# Patient Record
Sex: FEMALE | HISPANIC OR LATINO | Employment: FULL TIME | URBAN - METROPOLITAN AREA
[De-identification: names, ages, dates, MRNs, and addresses within clinical notes are randomized per-mention and may not be internally consistent; named-entity substitution may affect disease eponyms.]

---

## 2018-02-16 ENCOUNTER — HOSPITAL ENCOUNTER (OUTPATIENT)
Dept: LAB | Facility: HOSPITAL | Age: 38
Setting detail: SPECIMEN
Discharge: HOME | End: 2018-02-16
Attending: INTERNAL MEDICINE | Admitting: INTERNAL MEDICINE

## 2018-02-16 LAB
CONV MICROALBUM.,U,RANDOM: 16 MG/L
CREAT 24H UR-MCNC: 33 MG/DL
MICROALBUMIN/CREAT UR: 48.5 UG/MG

## 2018-10-08 ENCOUNTER — HOSPITAL ENCOUNTER (OUTPATIENT)
Dept: LAB | Facility: HOSPITAL | Age: 38
Setting detail: SPECIMEN
Discharge: HOME | End: 2018-10-08
Attending: INTERNAL MEDICINE | Admitting: INTERNAL MEDICINE

## 2018-10-08 LAB — HBA1C MFR BLD: 6.5 % (ref 0–5.6)

## 2019-03-06 ENCOUNTER — HOSPITAL ENCOUNTER (OUTPATIENT)
Dept: LAB | Facility: HOSPITAL | Age: 39
Setting detail: SPECIMEN
Discharge: HOME | End: 2019-03-06
Attending: INTERNAL MEDICINE | Admitting: INTERNAL MEDICINE

## 2019-06-25 RX ORDER — BLOOD SUGAR DIAGNOSTIC
STRIP MISCELLANEOUS
Qty: 200 EACH | Refills: 2 | Status: SHIPPED | OUTPATIENT
Start: 2019-06-25 | End: 2020-03-19 | Stop reason: SDUPTHER

## 2019-09-16 RX ORDER — INSULIN LISPRO 200 [IU]/ML
60 INJECTION, SOLUTION SUBCUTANEOUS DAILY
Qty: 30 ML | Refills: 0 | Status: CANCELLED | OUTPATIENT
Start: 2019-09-16

## 2019-09-16 RX ORDER — INSULIN LISPRO 200 [IU]/ML
INJECTION, SOLUTION SUBCUTANEOUS
Refills: 1 | OUTPATIENT
Start: 2019-09-16

## 2019-09-16 NOTE — ADDENDUM NOTE
Addended by: ANA ROSA LYON on: 9/16/2019 09:53 AM     Modules accepted: Orders, Level of Service, SmartSet

## 2019-09-17 ENCOUNTER — TELEPHONE (OUTPATIENT)
Dept: ENDOCRINOLOGY | Facility: CLINIC | Age: 39
End: 2019-09-17

## 2019-09-18 PROBLEM — E10.65 TYPE 1 DIABETES MELLITUS WITH HYPERGLYCEMIA: Status: ACTIVE | Noted: 2017-04-05

## 2019-09-18 PROBLEM — Z79.4 LONG TERM CURRENT USE OF INSULIN: Status: ACTIVE | Noted: 2017-04-05

## 2019-10-14 RX ORDER — INSULIN LISPRO 200 [IU]/ML
60 INJECTION, SOLUTION SUBCUTANEOUS DAILY
Qty: 30 ML | Refills: 1 | Status: SHIPPED | OUTPATIENT
Start: 2019-10-14 | End: 2020-03-19

## 2019-10-17 ENCOUNTER — TELEPHONE (OUTPATIENT)
Dept: ENDOCRINOLOGY | Facility: CLINIC | Age: 39
End: 2019-10-17

## 2019-10-17 NOTE — TELEPHONE ENCOUNTER
CALLED PATIENT TO GET THE 10/23  APPT RESCHEDULED. HAD TO LEAVE HER A DETAILED VM TO CALL THE [facility] TO GET RE-SCHEDULED

## 2020-01-13 ENCOUNTER — TELEPHONE (OUTPATIENT)
Dept: ENDOCRINOLOGY | Facility: CLINIC | Age: 40
End: 2020-01-13

## 2020-01-13 NOTE — TELEPHONE ENCOUNTER
Pt needs samples of insulin is it OK to give her samples of medications insulin to her. Last seen 03/06/2019, Takes Humalog and Levemir.

## 2020-01-24 NOTE — TELEPHONE ENCOUNTER
"Spoke with alejandra and informed her I was sending in a 30 day supply of her insulin. She stated we don't understand that she doesn't have insurance and she's usually good at keeping her appointments. I informed her I was just following doctor's orders. She stated we don't understand that she needs this and once she gets insurance she is finding a new doctor. I apologized and stated I understand, I'm just following doctor's orders. She stated \"she's fed up with shit\" and hung up on me.   "

## 2020-03-19 ENCOUNTER — OFFICE VISIT (OUTPATIENT)
Dept: ENDOCRINOLOGY | Facility: CLINIC | Age: 40
End: 2020-03-19

## 2020-03-19 VITALS
WEIGHT: 155 LBS | BODY MASS INDEX: 28.52 KG/M2 | DIASTOLIC BLOOD PRESSURE: 70 MMHG | SYSTOLIC BLOOD PRESSURE: 120 MMHG | OXYGEN SATURATION: 94 % | HEART RATE: 99 BPM | HEIGHT: 62 IN

## 2020-03-19 DIAGNOSIS — E78.2 MIXED HYPERLIPIDEMIA: ICD-10-CM

## 2020-03-19 DIAGNOSIS — E10.65 TYPE 1 DIABETES MELLITUS WITH HYPERGLYCEMIA (HCC): Primary | ICD-10-CM

## 2020-03-19 PROBLEM — O09.519 PRIMIGRAVIDA OF ADVANCED MATERNAL AGE: Status: ACTIVE | Noted: 2018-01-01

## 2020-03-19 PROBLEM — O24.019 TYPE 1 DIABETES MELLITUS DURING PREGNANCY: Status: ACTIVE | Noted: 2018-05-13

## 2020-03-19 PROBLEM — H54.7 CONGENITAL BLINDNESS: Status: ACTIVE | Noted: 2018-05-13

## 2020-03-19 PROBLEM — O24.919 DIABETES MELLITUS COMPLICATING PREGNANCY: Status: ACTIVE | Noted: 2018-12-03

## 2020-03-19 PROBLEM — O24.319 PREEXISTING DIABETES COMPLICATING PREGNANCY, ANTEPARTUM: Status: ACTIVE | Noted: 2018-01-01

## 2020-03-19 PROCEDURE — 99214 OFFICE O/P EST MOD 30 MIN: CPT | Performed by: INTERNAL MEDICINE

## 2020-03-19 RX ORDER — INSULIN GLARGINE 100 [IU]/ML
16 INJECTION, SOLUTION SUBCUTANEOUS DAILY
COMMUNITY
Start: 2018-12-13 | End: 2020-03-19

## 2020-03-19 RX ORDER — IBUPROFEN 800 MG/1
800 TABLET ORAL EVERY 8 HOURS
COMMUNITY
Start: 2018-12-13 | End: 2020-03-19

## 2020-03-19 RX ORDER — OXYCODONE HYDROCHLORIDE AND ACETAMINOPHEN 5; 325 MG/1; MG/1
1 TABLET ORAL
COMMUNITY
Start: 2018-12-13 | End: 2020-03-19

## 2020-03-19 RX ORDER — PRENATAL VIT,CAL 76/IRON/FOLIC 29 MG-1 MG
TABLET ORAL
COMMUNITY
Start: 2018-05-04 | End: 2020-03-19

## 2020-03-19 RX ORDER — INSULIN LISPRO 100 [IU]/ML
INJECTION, SOLUTION INTRAVENOUS; SUBCUTANEOUS
Qty: 15 PEN | Refills: 6 | Status: SHIPPED | OUTPATIENT
Start: 2020-03-19 | End: 2020-09-02 | Stop reason: SDUPTHER

## 2020-03-19 NOTE — PATIENT INSTRUCTIONS
Change Levemir to 30 units subcu twice a day  Continue Humalog 1 unit for each 5 g of carb per before meals and a sliding scale of 1 unit for each 50/150 over meals  Always keep glucose source with you in case of low blood sugar  Always get regular eye exam and flu vaccine  Please get labs done fasting in next few days  Follow-up in 4 to 6 weeks.

## 2020-03-19 NOTE — PROGRESS NOTES
Endocrine Progress Note Outpatient     Patient Care Team:  Elsi Covington MD as PCP - General    Chief Complaint: Follow-up type 1 diabetes    HPI: 39-year-old female with history of type 1 diabetes diagnosed in 2015.  She was following with Dr. Lan cleary, last seen in March 2019.  She did have some issues with insurance but now she has insurance and want to take care of herself.  She is currently using Levemir 34 units twice a day with Humalog 1 unit for each 5 g of carbohydrate.  She feels like she is having low blood sugars, her job requires her to be on her feet for about 10 hours a day 4 times a week and she is having some low blood sugars.  Unfortunately she did not bring any blood sugar records for review she is starting to check sugars now.  She has not been hospitalized or went to the emergency room or required assistance for low blood sugar since last seen in March 2019.  Has done diabetes education, last eye exam was 2 years ago and she is advised to get that done.  Diabetes complications: None.  She does have hyperlipidemia but she is not taking any cholesterol-lowering medications at this time.  She is not planning anymore pregnancies.    Past Medical History:   Diagnosis Date   • Diabetes mellitus type I (CMS/[facility])    • Hyperlipidemia        Social History     Socioeconomic History   • Marital status:      Spouse name: Not on file   • Number of children: Not on file   • Years of education: Not on file   • Highest education level: Not on file   Tobacco Use   • Smoking status: Never Smoker   Substance and Sexual Activity   • Alcohol use: Never     Frequency: Never       Family History   Problem Relation Age of Onset   • Bipolar disorder Mother    • Schizophrenia Mother    • Ulcers Father    • Asthma Sister        Allergies   Allergen Reactions   • Codeine Other (See Comments)     Memory loss          ROS:   Constitutional:  Admit fatigue, tiredness.    Eyes:  Denies change in visual  acuity   HENT:  Denies nasal congestion or sore throat   Respiratory: denies cough, shortness of breath.   Cardiovascular:  denies chest pain, edema   GI:  Denies abdominal pain, nausea, vomiting.   Musculoskeletal:  Denies back pain or joint pain   Integument:  Denies dry skin and rash   Neurologic:  Denies headache, focal weakness or sensory changes   Endocrine:  Denies polyuria or polydipsia   Psychiatric:  Denies depression or anxiety      Vitals:    03/19/20 1330   BP: 120/70   Pulse: 99   SpO2: 94%       Physical Exam:  GEN: NAD, conversant  EYES: EOMI, PERRL, no conjunctival erythema  NECK: no thyromegaly, full ROM   CV: RRR, no murmurs/rubs/gallops, no peripheral edema  LUNG: CTAB, no wheezes/rales/ronchi  SKIN: no rashes, no acanthosis  MSK: no deformities, full ROM of all extremities  NEURO: no tremors, DTR normal  PSYCH: AOX3, appropriate mood, affect normal      Results Review:     I reviewed the patient's new clinical results.    Lab Results   Component Value Date    HGBA1C 6.5 (H) 10/08/2018      Lab Results   Component Value Date    GLUCOSE 223 (H) 03/28/2017    BUN 10 12/10/2018    CREATININE 0.3 (L) 12/10/2018    BCR 33.3 12/10/2018    K 4.1 12/10/2018    CO2 21 (L) 12/10/2018    CALCIUM 8.3 (L) 12/10/2018    ALBUMIN 3.1 (L) 12/10/2018    LABIL2 0.8 (L) 12/10/2018    AST 22 12/10/2018    ALT 42 12/10/2018       Medication Review: Reviewed.       Current Outpatient Medications:   •  ACCU-CHEK LUIZA PLUS test strip, CHECK BLOOD SUGAR 7 TIMES DAILY BEFORE MEALS, ONE HOUR AFTER MEALS AND BEDTIME, Disp: 200 each, Rfl: 2  •  ACCU-CHEK FASTCLIX LANCETS misc, ACCU-CHEK FASTCLIX LANCETS, Disp: , Rfl:   •  Blood Glucose Monitoring Suppl (ACCU-CHEK LUIZA PLUS) w/Device kit, ACCU-CHEK LUIZA PLUS w/Device KIT, Disp: , Rfl:   •  glucose (DEX4) 4 GM chewable tablet, Chew 16 g., Disp: , Rfl:   •  HUMALOG KWIKPEN 200 UNIT/ML solution pen-injector, Inject 60 Units under the skin into the appropriate area as directed  Daily. INJECT PER SLIDING SCALE MAX 60 UNITS PER DAY DX:E11.65, Disp: 30 mL, Rfl: 1  •  insulin detemir (LEVEMIR FLEXTOUCH) 100 UNIT/ML injection, Inject 35 Units under the skin into the appropriate area as directed 2 (Two) Times a Day., Disp: 21 mL, Rfl: 0  •  Insulin Pen Needle (B-D UF III MINI PEN NEEDLES) 31G X 5 MM misc, BD PEN NEEDLE MINI U/F 31G X 5 MM, Disp: , Rfl:       Assessment/Plan   1.  Diabetes mellitus type 1: Uncontrolled with some low blood sugars, at this time I have asked her to decrease Levemir to 30 units twice a day and continue Humalog 1 unit for each 5 g of carbohydrate with meals.  She is advised to check blood sugars at least 3 ideally 4 times a day and bring the records for review at each visit.  Also advised to always keep glucose source with her and she does have Glucagon Emergency Kit.  Recommended to get an eye exam and flu vaccine on regular basis.  Also will get some labs.  2.  Hyperlipidemia: At this time she is not on any cholesterol-lowering medications, will check lipid panel and then make further recommendations.            Jody Saldana MD FACE.

## 2020-03-20 ENCOUNTER — LAB (OUTPATIENT)
Dept: LAB | Facility: HOSPITAL | Age: 40
End: 2020-03-20

## 2020-03-20 DIAGNOSIS — E78.2 MIXED HYPERLIPIDEMIA: ICD-10-CM

## 2020-03-20 DIAGNOSIS — E10.65 TYPE 1 DIABETES MELLITUS WITH HYPERGLYCEMIA (HCC): ICD-10-CM

## 2020-03-20 LAB
ALBUMIN SERPL-MCNC: 4.1 G/DL (ref 3.5–5.2)
ALBUMIN UR-MCNC: <1.2 MG/DL
ALBUMIN/GLOB SERPL: 1.4 G/DL
ALP SERPL-CCNC: 57 U/L (ref 39–117)
ALT SERPL W P-5'-P-CCNC: 20 U/L (ref 1–33)
ANION GAP SERPL CALCULATED.3IONS-SCNC: 10.8 MMOL/L (ref 5–15)
AST SERPL-CCNC: 16 U/L (ref 1–32)
BILIRUB SERPL-MCNC: 0.3 MG/DL (ref 0.2–1.2)
BUN BLD-MCNC: 13 MG/DL (ref 6–20)
BUN/CREAT SERPL: 21.3 (ref 7–25)
CALCIUM SPEC-SCNC: 8.8 MG/DL (ref 8.6–10.5)
CHLORIDE SERPL-SCNC: 101 MMOL/L (ref 98–107)
CHOLEST SERPL-MCNC: 145 MG/DL (ref 0–200)
CO2 SERPL-SCNC: 23.2 MMOL/L (ref 22–29)
CREAT BLD-MCNC: 0.61 MG/DL (ref 0.57–1)
CREAT UR-MCNC: 91.5 MG/DL
GFR SERPL CREATININE-BSD FRML MDRD: 109 ML/MIN/1.73
GFR SERPL CREATININE-BSD FRML MDRD: 132 ML/MIN/1.73
GLOBULIN UR ELPH-MCNC: 2.9 GM/DL
GLUCOSE BLD-MCNC: 321 MG/DL (ref 65–99)
HBA1C MFR BLD: 9 % (ref 3.5–5.6)
HDLC SERPL-MCNC: 42 MG/DL (ref 40–60)
LDLC SERPL CALC-MCNC: 89 MG/DL (ref 0–100)
LDLC/HDLC SERPL: 2.11 {RATIO}
MICROALBUMIN/CREAT UR: NORMAL MG/G{CREAT}
POTASSIUM BLD-SCNC: 4.3 MMOL/L (ref 3.5–5.2)
PROT SERPL-MCNC: 7 G/DL (ref 6–8.5)
SODIUM BLD-SCNC: 135 MMOL/L (ref 136–145)
TRIGL SERPL-MCNC: 71 MG/DL (ref 0–150)
VLDLC SERPL-MCNC: 14.2 MG/DL (ref 5–40)

## 2020-03-20 PROCEDURE — 36415 COLL VENOUS BLD VENIPUNCTURE: CPT

## 2020-03-20 PROCEDURE — 82570 ASSAY OF URINE CREATININE: CPT

## 2020-03-20 PROCEDURE — 83036 HEMOGLOBIN GLYCOSYLATED A1C: CPT

## 2020-03-20 PROCEDURE — 82043 UR ALBUMIN QUANTITATIVE: CPT

## 2020-03-20 PROCEDURE — 80061 LIPID PANEL: CPT

## 2020-03-20 PROCEDURE — 80053 COMPREHEN METABOLIC PANEL: CPT

## 2020-04-27 ENCOUNTER — TELEPHONE (OUTPATIENT)
Dept: ENDOCRINOLOGY | Facility: CLINIC | Age: 40
End: 2020-04-27

## 2020-04-29 RX ORDER — PREDNISOLONE ACETATE 10 MG/ML
SUSPENSION/ DROPS OPHTHALMIC EVERY 6 HOURS
COMMUNITY
Start: 2015-10-01 | End: 2020-04-30

## 2020-04-30 ENCOUNTER — TELEMEDICINE (OUTPATIENT)
Dept: ENDOCRINOLOGY | Facility: CLINIC | Age: 40
End: 2020-04-30

## 2020-04-30 VITALS — BODY MASS INDEX: 28.35 KG/M2 | HEIGHT: 62 IN

## 2020-04-30 DIAGNOSIS — E10.65 TYPE 1 DIABETES MELLITUS WITH HYPERGLYCEMIA (HCC): Primary | ICD-10-CM

## 2020-04-30 DIAGNOSIS — E78.2 MIXED HYPERLIPIDEMIA: ICD-10-CM

## 2020-04-30 PROCEDURE — 99214 OFFICE O/P EST MOD 30 MIN: CPT | Performed by: INTERNAL MEDICINE

## 2020-04-30 NOTE — PROGRESS NOTES
Endocrine Progress Note Outpatient     Patient Care Team:  Elsi Covington MD as PCP - General  You have chosen to receive care through a telehealth visit.  Do you consent to use a video/audio connection for your medical care today? Yes.   Chief Complaint: Uncontrolled type 1 diabetes: Visit conducted through audio and video conferencing through [employer].     HPI: 39-year-old female with history of type 1 diabetes since 2015, also have hyperlipidemia is followed through telehealth.  For type 1 diabetes: She is currently on Levemir 30 units at night with Humalog 1 unit for each 5 g of carbohydrate.  Diabetes complication: None  Hyperlipidemia: She is not taking any cholesterol-lowering medications at this time.    Past Medical History:   Diagnosis Date   • Diabetes mellitus type I (CMS/[facility])    • Hyperlipidemia        Social History     Socioeconomic History   • Marital status:      Spouse name: Not on file   • Number of children: Not on file   • Years of education: Not on file   • Highest education level: Not on file   Tobacco Use   • Smoking status: Never Smoker   • Smokeless tobacco: Never Used   Substance and Sexual Activity   • Alcohol use: Never     Frequency: Never       Family History   Problem Relation Age of Onset   • Bipolar disorder Mother    • Schizophrenia Mother    • Ulcers Father    • Asthma Sister        Allergies   Allergen Reactions   • Codeine Other (See Comments)     Memory loss          ROS:   Constitutional:  Denies fatigue, tiredness.    Eyes:  Denies change in visual acuity   HENT:  Denies nasal congestion or sore throat   Respiratory: denies cough, shortness of breath.   Cardiovascular:  denies chest pain, edema   GI:  Denies abdominal pain, nausea, vomiting.   Musculoskeletal:  Denies back pain or joint pain   Integument:  Denies dry skin and rash   Neurologic:  Denies headache, focal weakness or sensory changes   Endocrine:  Denies polyuria or polydipsia   Psychiatric:   Denies depression or anxiety      There were no vitals filed for this visit.    Physical Exam:  GEN: NAD, patient looked healthy on video.    Alert and oriented and able to carry on conversation.  Respiratory effort was normal.  Mood and affect were normal.      Results Review:     I reviewed the patient's new clinical results.    Lab Results   Component Value Date    HGBA1C 9.0 (H) 03/20/2020    HGBA1C 6.5 (H) 10/08/2018      Lab Results   Component Value Date    GLUCOSE 321 (H) 03/20/2020    BUN 13 03/20/2020    CREATININE 0.61 03/20/2020    EGFRIFNONA 109 03/20/2020    EGFRIFAFRI 132 03/20/2020    BCR 21.3 03/20/2020    K 4.3 03/20/2020    CO2 23.2 03/20/2020    CALCIUM 8.8 03/20/2020    ALBUMIN 4.10 03/20/2020    LABIL2 0.8 (L) 12/10/2018    AST 16 03/20/2020    ALT 20 03/20/2020    CHOL 145 03/20/2020    TRIG 71 03/20/2020    LDL 89 03/20/2020    HDL 42 03/20/2020     No results found for: TSH, FREET4, THYROIDAB      Medication Review: Reviewed.       Current Outpatient Medications:   •  ACCU-CHEK FASTCLIX LANCETS misc, ACCU-CHEK FASTCLIX LANCETS, Disp: , Rfl:   •  Blood Glucose Monitoring Suppl (ACCU-CHEK LUIZA PLUS) w/Device kit, ACCU-CHEK LUIZA PLUS w/Device KIT, Disp: , Rfl:   •  glucose (DEX4) 4 GM chewable tablet, Chew 4 tablets As Needed for Low Blood Sugar., Disp: 100 tablet, Rfl: 6  •  glucose blood (Accu-Chek Luiza Plus) test strip, Check blood sugars 4 times a day, Disp: 200 each, Rfl: 6  •  insulin detemir (Levemir FlexTouch) 100 UNIT/ML injection, Inject 30 units subcu twice a day., Disp: 15 pen, Rfl: 6  •  Insulin Lispro, 1 Unit Dial, (HumaLOG KwikPen) 100 UNIT/ML solution pen-injector, 1 unit for each 5 g of carbohydrate before each meal., Disp: 15 pen, Rfl: 6  •  Insulin Pen Needle (B-D UF III MINI PEN NEEDLES) 31G X 5 MM misc, Used to inject insulin 4 times a day, Disp: 100 each, Rfl: 6      Assessment/Plan   1.  Diabetes mellitus type 1: Uncontrolled with high A1c of 9%.  Have advised her to  send me blood sugar records for review.  She tells me that on the days that she works she is physically active and has some low blood sugars, I have asked her to take Levemir 26 units at night before on the days that she is going to work and continue 30 night before the days that she is now out of work.  We did talk about insulin pump however given the financial situation she is not interested in that at this time.  She is advised to always keep glucose source in case of low blood sugar and get regular eye exam and flu vaccine.  2.  Hyperlipidemia: LDL is 89 and triglycerides 71 and she is not on any medications at this time.            Jody Saldana MD FACE.

## 2020-04-30 NOTE — PATIENT INSTRUCTIONS
On the days that you work night before that take Levemir 26 units  On the days that you do not work you can take Levemir 26 units the night before  Always keep glucose source with you in case of low blood sugar  Get regular eye exam  Follow-up in 3 months with labs.

## 2020-08-27 ENCOUNTER — TELEMEDICINE (OUTPATIENT)
Dept: ENDOCRINOLOGY | Facility: CLINIC | Age: 40
End: 2020-08-27

## 2020-08-27 ENCOUNTER — LAB (OUTPATIENT)
Dept: LAB | Facility: HOSPITAL | Age: 40
End: 2020-08-27

## 2020-08-27 VITALS — WEIGHT: 153 LBS | HEIGHT: 62 IN | BODY MASS INDEX: 28.16 KG/M2

## 2020-08-27 DIAGNOSIS — E10.65 TYPE 1 DIABETES MELLITUS WITH HYPERGLYCEMIA (HCC): ICD-10-CM

## 2020-08-27 DIAGNOSIS — E78.2 MIXED HYPERLIPIDEMIA: ICD-10-CM

## 2020-08-27 DIAGNOSIS — E10.65 TYPE 1 DIABETES MELLITUS WITH HYPERGLYCEMIA (HCC): Primary | ICD-10-CM

## 2020-08-27 LAB
ALBUMIN SERPL-MCNC: 4 G/DL (ref 3.5–5.2)
ALBUMIN UR-MCNC: <1.2 MG/DL
ALBUMIN/GLOB SERPL: 1.2 G/DL
ALP SERPL-CCNC: 55 U/L (ref 39–117)
ALT SERPL W P-5'-P-CCNC: 20 U/L (ref 1–33)
ANION GAP SERPL CALCULATED.3IONS-SCNC: 8.1 MMOL/L (ref 5–15)
AST SERPL-CCNC: 9 U/L (ref 1–32)
BILIRUB SERPL-MCNC: 0.4 MG/DL (ref 0–1.2)
BUN SERPL-MCNC: 9 MG/DL (ref 6–20)
BUN/CREAT SERPL: 16.7 (ref 7–25)
CALCIUM SPEC-SCNC: 8.8 MG/DL (ref 8.6–10.5)
CHLORIDE SERPL-SCNC: 100 MMOL/L (ref 98–107)
CHOLEST SERPL-MCNC: 147 MG/DL (ref 0–200)
CO2 SERPL-SCNC: 22.9 MMOL/L (ref 22–29)
CREAT SERPL-MCNC: 0.54 MG/DL (ref 0.57–1)
CREAT UR-MCNC: 54.5 MG/DL
GFR SERPL CREATININE-BSD FRML MDRD: 126 ML/MIN/1.73
GFR SERPL CREATININE-BSD FRML MDRD: >150 ML/MIN/1.73
GLOBULIN UR ELPH-MCNC: 3.4 GM/DL
GLUCOSE SERPL-MCNC: 339 MG/DL (ref 65–99)
HBA1C MFR BLD: 8.6 % (ref 3.5–5.6)
HDLC SERPL-MCNC: 42 MG/DL (ref 40–60)
LDLC SERPL CALC-MCNC: 88 MG/DL (ref 0–100)
LDLC/HDLC SERPL: 2.1 {RATIO}
MICROALBUMIN/CREAT UR: NORMAL MG/G{CREAT}
POTASSIUM SERPL-SCNC: 4.5 MMOL/L (ref 3.5–5.2)
PROT SERPL-MCNC: 7.4 G/DL (ref 6–8.5)
SODIUM SERPL-SCNC: 131 MMOL/L (ref 136–145)
TRIGL SERPL-MCNC: 84 MG/DL (ref 0–150)
VLDLC SERPL-MCNC: 16.8 MG/DL (ref 5–40)

## 2020-08-27 PROCEDURE — 80053 COMPREHEN METABOLIC PANEL: CPT

## 2020-08-27 PROCEDURE — 36415 COLL VENOUS BLD VENIPUNCTURE: CPT

## 2020-08-27 PROCEDURE — 83036 HEMOGLOBIN GLYCOSYLATED A1C: CPT

## 2020-08-27 PROCEDURE — 82043 UR ALBUMIN QUANTITATIVE: CPT

## 2020-08-27 PROCEDURE — 80061 LIPID PANEL: CPT

## 2020-08-27 PROCEDURE — 99214 OFFICE O/P EST MOD 30 MIN: CPT | Performed by: INTERNAL MEDICINE

## 2020-08-27 PROCEDURE — 82570 ASSAY OF URINE CREATININE: CPT

## 2020-08-27 RX ORDER — DEXTROSE 4 G
TABLET,CHEWABLE ORAL
COMMUNITY
Start: 2020-05-23 | End: 2021-04-12

## 2020-08-27 NOTE — PATIENT INSTRUCTIONS
Continue current medications  Always keep glucose source in case of low blood sugar  Get annual eye exam  Follow-up in 6 months with labs.

## 2020-08-27 NOTE — PROGRESS NOTES
Endocrine Progress Note Outpatient     Patient Care Team:  Elsi Covington MD as PCP - General  You have chosen to receive care through a telehealth visit.  Do you consent to use a video/audio connection for your medical care today? Yes.   Chief Complaint: Uncontrolled type 1 diabetes: Visit conducted through audio and video conferencing through [employer].     HPI: 39-year-old female with history of type 1 diabetes since 2015, also have hyperlipidemia is followed through telehealth.    For type 1 diabetes: She is currently on Levemir 26 units units at night on the days she works and 30 units on the days she does not work along with Humalog 1 unit for each 5 g of carbohydrate.  She tells me that she has been trying to work on her diet her blood sugars have improved she denies any low blood sugars.  Diabetes complication: None  Hyperlipidemia: She is not taking any cholesterol-lowering medications at this time.    Past Medical History:   Diagnosis Date   • Diabetes mellitus type I (CMS/[facility])    • Hyperlipidemia        Social History     Socioeconomic History   • Marital status:      Spouse name: Not on file   • Number of children: Not on file   • Years of education: Not on file   • Highest education level: Not on file   Tobacco Use   • Smoking status: Never Smoker   • Smokeless tobacco: Never Used   Substance and Sexual Activity   • Alcohol use: Never     Frequency: Never   • Drug use: Defer   • Sexual activity: Defer       Family History   Problem Relation Age of Onset   • Bipolar disorder Mother    • Schizophrenia Mother    • Ulcers Father    • Asthma Sister        Allergies   Allergen Reactions   • Codeine Other (See Comments)     Memory loss          ROS:   Constitutional:  Denies fatigue, tiredness.    Eyes:  Denies change in visual acuity   HENT:  Denies nasal congestion or sore throat   Respiratory: denies cough, shortness of breath.   Cardiovascular:  denies chest pain, edema   GI:  Denies  abdominal pain, nausea, vomiting.   Musculoskeletal:  Denies back pain or joint pain   Integument:  Denies dry skin and rash   Neurologic:  Denies headache, focal weakness or sensory changes   Endocrine:  Denies polyuria or polydipsia   Psychiatric:  Denies depression or anxiety      There were no vitals filed for this visit.    Physical Exam:  GEN: NAD, patient looked healthy on video.    Alert and oriented and able to carry on conversation.  Respiratory effort was normal.  Mood and affect were normal.      Results Review:     I reviewed the patient's new clinical results.    Lab Results   Component Value Date    HGBA1C 8.6 (H) 08/27/2020    HGBA1C 9.0 (H) 03/20/2020    HGBA1C 6.5 (H) 10/08/2018      Lab Results   Component Value Date    GLUCOSE 321 (H) 03/20/2020    BUN 13 03/20/2020    CREATININE 0.61 03/20/2020    EGFRIFNONA 109 03/20/2020    EGFRIFAFRI 132 03/20/2020    BCR 21.3 03/20/2020    K 4.3 03/20/2020    CO2 23.2 03/20/2020    CALCIUM 8.8 03/20/2020    ALBUMIN 4.10 03/20/2020    LABIL2 0.8 (L) 12/10/2018    AST 16 03/20/2020    ALT 20 03/20/2020    CHOL 145 03/20/2020    TRIG 71 03/20/2020    LDL 89 03/20/2020    HDL 42 03/20/2020         Medication Review: Reviewed.       Current Outpatient Medications:   •  ACCU-CHEK FASTCLIX LANCETS misc, ACCU-CHEK FASTCLIX LANCETS, Disp: , Rfl:   •  Blood Glucose Monitoring Suppl (ACCU-CHEK LUIZA PLUS) w/Device kit, ACCU-CHEK LUIZA PLUS w/Device KIT, Disp: , Rfl:   •  [employer]S GLUCOSE 4-6 GM-MG chewable tablet chewable tablet, CHEW 4 TABLETS AS NEEDED FOR LOW BLOOD SUGAR., Disp: , Rfl:   •  glucose (DEX4) 4 GM chewable tablet, Chew 4 tablets As Needed for Low Blood Sugar., Disp: 100 tablet, Rfl: 6  •  glucose blood (Accu-Chek Luiza Plus) test strip, Check blood sugars 4 times a day, Disp: 200 each, Rfl: 6  •  insulin detemir (Levemir FlexTouch) 100 UNIT/ML injection, Inject 30 units subcu twice a day., Disp: 15 pen, Rfl: 6  •  Insulin Lispro, 1 Unit Dial, (HumaLOG  KwikPen) 100 UNIT/ML solution pen-injector, 1 unit for each 5 g of carbohydrate before each meal., Disp: 15 pen, Rfl: 6  •  Insulin Pen Needle (B-D UF III MINI PEN NEEDLES) 31G X 5 MM misc, Used to inject insulin 4 times a day, Disp: 100 each, Rfl: 6      Assessment/Plan   1.  Diabetes mellitus type 1: Uncontrolled with high A1c of 8.6%.  She is trying to work on her diet.  For now I have asked her to continue Levemir 26 units at night before on the days that she is going to work and continue 30 night before the days that she is now out of work.  We did talk about insulin pump however given the financial situation she is not interested in that at this time.  She is advised to always keep glucose source in case of low blood sugar and get regular eye exam and flu vaccine.    2.  Hyperlipidemia: Lipid panel pending.  Will follow.            Jody Saldana MD FACE.

## 2020-09-02 RX ORDER — INSULIN LISPRO 100 [IU]/ML
INJECTION, SOLUTION INTRAVENOUS; SUBCUTANEOUS
Qty: 15 ML | Refills: 5 | Status: SHIPPED | OUTPATIENT
Start: 2020-09-02 | End: 2021-02-19 | Stop reason: SDUPTHER

## 2020-09-02 RX ORDER — INSULIN LISPRO 100 [IU]/ML
INJECTION, SOLUTION INTRAVENOUS; SUBCUTANEOUS
Qty: 15 PEN | Refills: 5 | Status: SHIPPED | OUTPATIENT
Start: 2020-09-02 | End: 2020-09-02

## 2020-12-23 ENCOUNTER — TELEPHONE (OUTPATIENT)
Dept: ENDOCRINOLOGY | Facility: CLINIC | Age: 40
End: 2020-12-23

## 2020-12-23 DIAGNOSIS — E10.65 TYPE 1 DIABETES MELLITUS WITH HYPERGLYCEMIA (HCC): Primary | ICD-10-CM

## 2020-12-23 NOTE — TELEPHONE ENCOUNTER
Pt called and stated she tested positive for covid. Reviewed sick day guidelines and told pt to call [facility] hotline if experiencing consistently high BGs or has ketones. Pt also requested a refill on pen needles. Rx's for ketostix and needles ready for signature.

## 2020-12-24 RX ORDER — FLURBIPROFEN SODIUM 0.3 MG/ML
SOLUTION/ DROPS OPHTHALMIC
Qty: 100 EACH | Refills: 6 | Status: SHIPPED | OUTPATIENT
Start: 2020-12-24 | End: 2022-01-12 | Stop reason: SDUPTHER

## 2021-02-10 ENCOUNTER — TELEPHONE (OUTPATIENT)
Dept: ENDOCRINOLOGY | Facility: CLINIC | Age: 41
End: 2021-02-10

## 2021-02-10 DIAGNOSIS — E10.65 TYPE 1 DIABETES MELLITUS WITH HYPERGLYCEMIA (HCC): Primary | ICD-10-CM

## 2021-02-10 DIAGNOSIS — E78.2 MIXED HYPERLIPIDEMIA: ICD-10-CM

## 2021-02-19 ENCOUNTER — TELEMEDICINE (OUTPATIENT)
Dept: ENDOCRINOLOGY | Facility: CLINIC | Age: 41
End: 2021-02-19

## 2021-02-19 VITALS — WEIGHT: 163 LBS | HEIGHT: 62 IN | BODY MASS INDEX: 30 KG/M2

## 2021-02-19 DIAGNOSIS — E78.2 MIXED HYPERLIPIDEMIA: ICD-10-CM

## 2021-02-19 DIAGNOSIS — E10.65 TYPE 1 DIABETES MELLITUS WITH HYPERGLYCEMIA (HCC): Primary | ICD-10-CM

## 2021-02-19 PROCEDURE — 99214 OFFICE O/P EST MOD 30 MIN: CPT | Performed by: INTERNAL MEDICINE

## 2021-02-19 RX ORDER — INSULIN LISPRO 100 [IU]/ML
INJECTION, SOLUTION INTRAVENOUS; SUBCUTANEOUS
Qty: 15 ML | Refills: 5 | Status: SHIPPED | OUTPATIENT
Start: 2021-02-19 | End: 2021-05-27

## 2021-02-19 NOTE — PATIENT INSTRUCTIONS
Please continue current management  Always keep glucose source in case of low blood sugar  Annual eye exam and flu vaccine  Labs in the next few days

## 2021-02-19 NOTE — PROGRESS NOTES
Endocrine Progress Note Outpatient     Patient Care Team:  Elsi Covington MD as PCP - General  You have chosen to receive care through a telehealth visit.  Do you consent to use a video/audio connection for your medical care today? Yes.   Chief Complaint: Uncontrolled type 1 diabetes: Visit conducted through audio and video conferencing through [employer].     HPI: 40-year-old female with history of type 1 diabetes since 2015, also have hyperlipidemia is followed through telehealth.    For type 1 diabetes: She is currently on Levemir 30 units units at night on the days she works and 30 units on the days she does not work along with Humalog 1 unit for each 5 g of carbohydrate.  She tells me that she has been trying to work on her diet her blood sugars have improved she denies any low blood sugars.    Diabetes complication: None    Hyperlipidemia: She is not taking any cholesterol-lowering medications at this time.    Past Medical History:   Diagnosis Date   • Diabetes mellitus type I (CMS/[facility])    • Hyperlipidemia        Social History     Socioeconomic History   • Marital status:      Spouse name: Not on file   • Number of children: Not on file   • Years of education: Not on file   • Highest education level: Not on file   Tobacco Use   • Smoking status: Never Smoker   • Smokeless tobacco: Never Used   Substance and Sexual Activity   • Alcohol use: Never     Frequency: Never   • Drug use: Defer   • Sexual activity: Defer       Family History   Problem Relation Age of Onset   • Bipolar disorder Mother    • Schizophrenia Mother    • Ulcers Father    • Asthma Sister        Allergies   Allergen Reactions   • Codeine Other (See Comments)     Memory loss          ROS:   Constitutional:  Denies fatigue, tiredness.    Eyes:  Denies change in visual acuity   HENT:  Denies nasal congestion or sore throat   Respiratory: denies cough, shortness of breath.   Cardiovascular:  denies chest pain, edema   GI:  Denies  abdominal pain, nausea, vomiting.   Musculoskeletal:  Denies back pain or joint pain   Integument:  Denies dry skin and rash   Neurologic:  Denies headache, focal weakness or sensory changes   Endocrine:  Denies polyuria or polydipsia   Psychiatric:  Denies depression or anxiety      There were no vitals filed for this visit.    Physical Exam:  GEN: NAD, patient looked healthy on video.    Alert and oriented and able to carry on conversation.  Respiratory effort was normal.  Mood and affect were normal.      Results Review:     I reviewed the patient's new clinical results.    Lab Results   Component Value Date    HGBA1C 8.6 (H) 08/27/2020    HGBA1C 9.0 (H) 03/20/2020    HGBA1C 6.5 (H) 10/08/2018      Lab Results   Component Value Date    GLUCOSE 339 (H) 08/27/2020    BUN 9 08/27/2020    CREATININE 0.54 (L) 08/27/2020    EGFRIFNONA 126 08/27/2020    EGFRIFAFRI >150 08/27/2020    BCR 16.7 08/27/2020    K 4.5 08/27/2020    CO2 22.9 08/27/2020    CALCIUM 8.8 08/27/2020    ALBUMIN 4.00 08/27/2020    LABIL2 0.8 (L) 12/10/2018    AST 9 08/27/2020    ALT 20 08/27/2020    CHOL 147 08/27/2020    TRIG 84 08/27/2020    LDL 88 08/27/2020    HDL 42 08/27/2020         Medication Review: Reviewed.       Current Outpatient Medications:   •  ACCU-CHEK FASTCLIX LANCETS misc, ACCU-CHEK FASTCLIX LANCETS, Disp: , Rfl:   •  acetone, urine, test strip, Use to test for ketones as needed, Disp: 50 strip, Rfl: 1  •  Blood Glucose Monitoring Suppl (ACCU-CHEK LUIZA PLUS) w/Device kit, ACCU-CHEK LUIZA PLUS w/Device KIT, Disp: , Rfl:   •  CVS GLUCOSE 4-6 GM-MG chewable tablet chewable tablet, CHEW 4 TABLETS AS NEEDED FOR LOW BLOOD SUGAR., Disp: , Rfl:   •  glucose (DEX4) 4 GM chewable tablet, Chew 4 tablets As Needed for Low Blood Sugar., Disp: 100 tablet, Rfl: 6  •  glucose blood (Accu-Chek Luiza Plus) test strip, Check blood sugars 4 times a day, Disp: 200 each, Rfl: 6  •  insulin detemir (Levemir FlexTouch) 100 UNIT/ML injection, Inject 30  units subcu twice a day., Disp: 15 pen, Rfl: 6  •  Insulin Lispro, 1 Unit Dial, (HumaLOG KwikPen) 100 UNIT/ML solution pen-injector, 1 unit for each 5 g of carbohydrate before each meal. MDD 50 units, Disp: 15 mL, Rfl: 5  •  Insulin Pen Needle (B-D UF III MINI PEN NEEDLES) 31G X 5 MM misc, Used to inject insulin 4 times a day, Disp: 100 each, Rfl: 6      Assessment/Plan   1.  Diabetes mellitus type 1: No blood sugar records and no A1c.  At this time we will continue her current management.  I think she will benefit from insulin pump and continuous monitoring system.  She is not interested due to the financial situation. She is advised to always keep glucose source in case of low blood sugar and get regular eye exam and flu vaccine.    2.  Hyperlipidemia: No recent labs, will follow lipid panel.            Jody Saldana MD FACE.

## 2021-04-09 DIAGNOSIS — E10.65 TYPE 1 DIABETES MELLITUS WITH HYPERGLYCEMIA (HCC): Primary | ICD-10-CM

## 2021-04-12 RX ORDER — INSULIN DETEMIR 100 [IU]/ML
INJECTION, SOLUTION SUBCUTANEOUS
Qty: 18 PEN | Refills: 44 | Status: SHIPPED | OUTPATIENT
Start: 2021-04-12 | End: 2022-07-06

## 2021-04-12 RX ORDER — BLOOD SUGAR DIAGNOSTIC
STRIP MISCELLANEOUS
Qty: 200 EACH | Refills: 6 | Status: SHIPPED | OUTPATIENT
Start: 2021-04-12

## 2021-04-12 RX ORDER — DEXTROSE 4 G
TABLET,CHEWABLE ORAL
Qty: 100 TABLET | Refills: 6 | Status: SHIPPED | OUTPATIENT
Start: 2021-04-12

## 2021-05-24 DIAGNOSIS — E10.65 TYPE 1 DIABETES MELLITUS WITH HYPERGLYCEMIA (HCC): ICD-10-CM

## 2021-05-24 RX ORDER — FLURBIPROFEN SODIUM 0.3 MG/ML
SOLUTION/ DROPS OPHTHALMIC
Refills: 5 | OUTPATIENT
Start: 2021-05-24

## 2021-05-27 RX ORDER — INSULIN LISPRO 100 [IU]/ML
INJECTION, SOLUTION INTRAVENOUS; SUBCUTANEOUS
Qty: 45 ML | Refills: 3 | Status: SHIPPED | OUTPATIENT
Start: 2021-05-27 | End: 2022-03-14

## 2021-07-06 DIAGNOSIS — E10.65 TYPE 1 DIABETES MELLITUS WITH HYPERGLYCEMIA (HCC): Primary | ICD-10-CM

## 2021-07-06 RX ORDER — BLOOD-GLUCOSE METER
EACH MISCELLANEOUS
Qty: 1 KIT | Refills: 1 | Status: SHIPPED | OUTPATIENT
Start: 2021-07-06

## 2022-01-12 DIAGNOSIS — E10.65 TYPE 1 DIABETES MELLITUS WITH HYPERGLYCEMIA: ICD-10-CM

## 2022-01-12 RX ORDER — FLURBIPROFEN SODIUM 0.3 MG/ML
SOLUTION/ DROPS OPHTHALMIC
Qty: 100 EACH | Refills: 6 | Status: SHIPPED | OUTPATIENT
Start: 2022-01-12 | End: 2022-10-31

## 2022-03-14 RX ORDER — INSULIN LISPRO 100 [IU]/ML
INJECTION, SOLUTION INTRAVENOUS; SUBCUTANEOUS
Qty: 15 ML | Refills: 5 | Status: SHIPPED | OUTPATIENT
Start: 2022-03-14 | End: 2022-07-29

## 2022-06-01 ENCOUNTER — TELEPHONE (OUTPATIENT)
Dept: ENDOCRINOLOGY | Facility: CLINIC | Age: 42
End: 2022-06-01

## 2022-06-01 NOTE — TELEPHONE ENCOUNTER
[facility] papers filled out as much as I could and placed in Dr Saldana's inbox for completion and signature.

## 2022-06-14 ENCOUNTER — TELEPHONE (OUTPATIENT)
Dept: ENDOCRINOLOGY | Facility: CLINIC | Age: 42
End: 2022-06-14

## 2022-06-14 NOTE — TELEPHONE ENCOUNTER
Attempted to contact pt to reschedule her 7/5 appt. Lm on pts vm. Will offer appt next week when pt calls back.

## 2022-06-28 ENCOUNTER — TELEPHONE (OUTPATIENT)
Dept: ENDOCRINOLOGY | Facility: CLINIC | Age: 42
End: 2022-06-28

## 2022-06-28 NOTE — TELEPHONE ENCOUNTER
Tried to call patient regarding lab being closed. No answer left vm stating we are cx'ing lab appt for our office and ask that she goes to [facility] or contact our office and let know where to send orders.

## 2022-07-06 DIAGNOSIS — E10.65 TYPE 1 DIABETES MELLITUS WITH HYPERGLYCEMIA: ICD-10-CM

## 2022-07-06 RX ORDER — INSULIN DETEMIR 100 [IU]/ML
INJECTION, SOLUTION SUBCUTANEOUS
Qty: 15 ML | Refills: 4 | Status: SHIPPED | OUTPATIENT
Start: 2022-07-06 | End: 2023-01-18

## 2022-07-29 RX ORDER — INSULIN LISPRO 100 [IU]/ML
INJECTION, SOLUTION INTRAVENOUS; SUBCUTANEOUS
Qty: 15 ML | Refills: 5 | Status: SHIPPED | OUTPATIENT
Start: 2022-07-29 | End: 2022-11-28

## 2022-10-30 DIAGNOSIS — E10.65 TYPE 1 DIABETES MELLITUS WITH HYPERGLYCEMIA: ICD-10-CM

## 2022-10-31 RX ORDER — FLURBIPROFEN SODIUM 0.3 MG/ML
SOLUTION/ DROPS OPHTHALMIC
Qty: 100 EACH | Refills: 6 | Status: SHIPPED | OUTPATIENT
Start: 2022-10-31

## 2022-11-28 RX ORDER — INSULIN LISPRO 100 [IU]/ML
INJECTION, SOLUTION INTRAVENOUS; SUBCUTANEOUS
Qty: 15 ML | Refills: 3 | Status: SHIPPED | OUTPATIENT
Start: 2022-11-28 | End: 2022-11-28 | Stop reason: SDUPTHER

## 2022-11-28 RX ORDER — INSULIN LISPRO 100 [IU]/ML
INJECTION, SOLUTION INTRAVENOUS; SUBCUTANEOUS
Qty: 15 ML | Refills: 3 | Status: SHIPPED | OUTPATIENT
Start: 2022-11-28 | End: 2023-01-23

## 2023-01-17 DIAGNOSIS — E10.65 TYPE 1 DIABETES MELLITUS WITH HYPERGLYCEMIA: ICD-10-CM

## 2023-01-18 RX ORDER — INSULIN DETEMIR 100 [IU]/ML
30 INJECTION, SOLUTION SUBCUTANEOUS 2 TIMES DAILY
Qty: 15 ML | Refills: 0 | Status: SHIPPED | OUTPATIENT
Start: 2023-01-18 | End: 2023-02-09 | Stop reason: SDUPTHER

## 2023-01-23 ENCOUNTER — TELEPHONE (OUTPATIENT)
Dept: ENDOCRINOLOGY | Facility: CLINIC | Age: 43
End: 2023-01-23
Payer: COMMERCIAL

## 2023-01-23 DIAGNOSIS — E10.65 TYPE 1 DIABETES MELLITUS WITH HYPERGLYCEMIA: Primary | ICD-10-CM

## 2023-01-23 RX ORDER — INSULIN ASPART 100 [IU]/ML
INJECTION, SOLUTION INTRAVENOUS; SUBCUTANEOUS
Qty: 15 ML | Refills: 3 | Status: SHIPPED | OUTPATIENT
Start: 2023-01-23 | End: 2023-02-09 | Stop reason: SDUPTHER

## 2023-01-23 NOTE — TELEPHONE ENCOUNTER
Jody Saldana MD  to Me        1:31 PM  Okay to change to NovoLog.     LVM for pt to give the office a call back.

## 2023-02-02 ENCOUNTER — LAB (OUTPATIENT)
Dept: LAB | Facility: HOSPITAL | Age: 43
End: 2023-02-02
Payer: COMMERCIAL

## 2023-02-02 DIAGNOSIS — E10.65 TYPE 1 DIABETES MELLITUS WITH HYPERGLYCEMIA: ICD-10-CM

## 2023-02-02 DIAGNOSIS — E10.65 TYPE 1 DIABETES MELLITUS WITH HYPERGLYCEMIA: Primary | ICD-10-CM

## 2023-02-02 DIAGNOSIS — E78.2 MIXED HYPERLIPIDEMIA: ICD-10-CM

## 2023-02-02 LAB
ALBUMIN SERPL-MCNC: 4 G/DL (ref 3.5–5.2)
ALBUMIN UR-MCNC: <1.2 MG/DL
ALBUMIN/GLOB SERPL: 1.3 G/DL
ALP SERPL-CCNC: 64 U/L (ref 39–117)
ALT SERPL W P-5'-P-CCNC: 22 U/L (ref 1–33)
ANION GAP SERPL CALCULATED.3IONS-SCNC: 13 MMOL/L (ref 5–15)
AST SERPL-CCNC: 14 U/L (ref 1–32)
BILIRUB SERPL-MCNC: 0.9 MG/DL (ref 0–1.2)
BUN SERPL-MCNC: 15 MG/DL (ref 6–20)
BUN/CREAT SERPL: 22.4 (ref 7–25)
CALCIUM SPEC-SCNC: 9.4 MG/DL (ref 8.6–10.5)
CHLORIDE SERPL-SCNC: 95 MMOL/L (ref 98–107)
CHOLEST SERPL-MCNC: 166 MG/DL (ref 0–200)
CO2 SERPL-SCNC: 26 MMOL/L (ref 22–29)
CREAT SERPL-MCNC: 0.67 MG/DL (ref 0.57–1)
CREAT UR-MCNC: 41.3 MG/DL
EGFRCR SERPLBLD CKD-EPI 2021: 112.1 ML/MIN/1.73
GLOBULIN UR ELPH-MCNC: 3.2 GM/DL
GLUCOSE SERPL-MCNC: 437 MG/DL (ref 65–99)
HBA1C MFR BLD: 9.3 % (ref 3.5–5.6)
HDLC SERPL-MCNC: 43 MG/DL (ref 40–60)
LDLC SERPL CALC-MCNC: 106 MG/DL (ref 0–100)
LDLC/HDLC SERPL: 2.43 {RATIO}
MICROALBUMIN/CREAT UR: NORMAL MG/G{CREAT}
POTASSIUM SERPL-SCNC: 4.6 MMOL/L (ref 3.5–5.2)
PROT SERPL-MCNC: 7.2 G/DL (ref 6–8.5)
SODIUM SERPL-SCNC: 134 MMOL/L (ref 136–145)
TRIGL SERPL-MCNC: 93 MG/DL (ref 0–150)
VLDLC SERPL-MCNC: 17 MG/DL (ref 5–40)

## 2023-02-02 PROCEDURE — 36415 COLL VENOUS BLD VENIPUNCTURE: CPT

## 2023-02-02 PROCEDURE — 80053 COMPREHEN METABOLIC PANEL: CPT

## 2023-02-02 PROCEDURE — 83036 HEMOGLOBIN GLYCOSYLATED A1C: CPT

## 2023-02-02 PROCEDURE — 80061 LIPID PANEL: CPT

## 2023-02-02 PROCEDURE — 82043 UR ALBUMIN QUANTITATIVE: CPT

## 2023-02-02 PROCEDURE — 82570 ASSAY OF URINE CREATININE: CPT

## 2023-02-09 ENCOUNTER — OFFICE VISIT (OUTPATIENT)
Dept: ENDOCRINOLOGY | Facility: CLINIC | Age: 43
End: 2023-02-09
Payer: COMMERCIAL

## 2023-02-09 VITALS
HEIGHT: 62 IN | DIASTOLIC BLOOD PRESSURE: 80 MMHG | HEART RATE: 100 BPM | BODY MASS INDEX: 30.29 KG/M2 | SYSTOLIC BLOOD PRESSURE: 136 MMHG | WEIGHT: 164.6 LBS

## 2023-02-09 DIAGNOSIS — E10.65 TYPE 1 DIABETES MELLITUS WITH HYPERGLYCEMIA: Primary | ICD-10-CM

## 2023-02-09 DIAGNOSIS — E78.2 MIXED HYPERLIPIDEMIA: ICD-10-CM

## 2023-02-09 LAB — GLUCOSE BLDC GLUCOMTR-MCNC: 305 MG/DL (ref 70–105)

## 2023-02-09 PROCEDURE — 82962 GLUCOSE BLOOD TEST: CPT | Performed by: INTERNAL MEDICINE

## 2023-02-09 PROCEDURE — 99214 OFFICE O/P EST MOD 30 MIN: CPT | Performed by: INTERNAL MEDICINE

## 2023-02-09 RX ORDER — PROCHLORPERAZINE 25 MG/1
1 SUPPOSITORY RECTAL DAILY
Qty: 1 EACH | Refills: 0 | Status: SHIPPED | OUTPATIENT
Start: 2023-02-09

## 2023-02-09 RX ORDER — PROCHLORPERAZINE 25 MG/1
1 SUPPOSITORY RECTAL DAILY
Qty: 1 EACH | Refills: 1 | Status: SHIPPED | OUTPATIENT
Start: 2023-02-09

## 2023-02-09 RX ORDER — INSULIN DETEMIR 100 [IU]/ML
INJECTION, SOLUTION SUBCUTANEOUS
Qty: 15 ML | Refills: 11 | Status: SHIPPED | OUTPATIENT
Start: 2023-02-09 | End: 2023-03-14

## 2023-02-09 RX ORDER — PROCHLORPERAZINE 25 MG/1
SUPPOSITORY RECTAL
Qty: 2 EACH | Refills: 6 | Status: SHIPPED | OUTPATIENT
Start: 2023-02-09

## 2023-02-09 RX ORDER — INSULIN ASPART 100 [IU]/ML
INJECTION, SOLUTION INTRAVENOUS; SUBCUTANEOUS
Qty: 30 ML | Refills: 6 | Status: SHIPPED | OUTPATIENT
Start: 2023-02-09

## 2023-02-09 NOTE — PATIENT INSTRUCTIONS
Continue current management  Add sliding scale to your NovoLog 1 unit for each 30 mg blood sugar over 140 at meals  I have sent the prescription for Dexcom monitoring system, please use that to monitor your sugars and adjust the insulin dosages based upon the blood sugars  Always keep glucose source in case of low blood sugar  Continue to work on your diet  Labs before follow-up.

## 2023-02-09 NOTE — PROGRESS NOTES
Endocrine Progress Note Outpatient     Patient Care Team:  Elsi Covington MD as PCP - General    Chief complaint: Follow-up type 1 diabetes    HPI: 42-year-old female with history of type 1 diabetes since 2015, also have hyperlipidemia is here for follow-up    For type 1 diabetes: She is currently on Levemir 30 units units twice a day, and NovoLog 1 unit for each 5 g of carbohydrate before meals.  Currently using Livongo glucometer, we reviewed that.  Her 30-day average blood sugar is 200.  She is spending 35% in the range.    Diabetes complication: None    Hyperlipidemia: She is not taking any cholesterol-lowering medications at this time.    Past Medical History:   Diagnosis Date   • Diabetes mellitus type I (HCC)    • Hyperlipidemia        Social History     Socioeconomic History   • Marital status:    Tobacco Use   • Smoking status: Never   • Smokeless tobacco: Never   Vaping Use   • Vaping Use: Never used   Substance and Sexual Activity   • Alcohol use: Never   • Drug use: Defer   • Sexual activity: Defer       Family History   Problem Relation Age of Onset   • Bipolar disorder Mother    • Schizophrenia Mother    • Ulcers Father    • Asthma Sister        Allergies   Allergen Reactions   • Codeine Other (See Comments)     Memory loss          ROS:   Constitutional:  Denies fatigue, tiredness.    Eyes:  Denies change in visual acuity   HENT:  Denies nasal congestion or sore throat   Respiratory: denies cough, shortness of breath.   Cardiovascular:  denies chest pain, edema   GI:  Denies abdominal pain, nausea, vomiting.   Musculoskeletal:  Denies back pain or joint pain   Integument:  Denies dry skin and rash   Neurologic:  Denies headache, focal weakness or sensory changes   Endocrine:  Denies polyuria or polydipsia   Psychiatric:  Denies depression or anxiety      Vitals:    02/09/23 1240   BP: 136/80   Pulse: 100     Body mass index is 30.11 kg/m².     Physical Exam:  GEN: NAD,  conversant  EYES: EOMI, PERRL, no conjunctival erythema  NECK: no thyromegaly, full ROM   CV: RRR, no murmurs/rubs/gallops, no peripheral edema  LUNG: CTAB, no wheezes/rales/ronchi  SKIN: no rashes, no acanthosis  MSK: no deformities, full ROM of all extremities  NEURO: no tremors, DTR normal  PSYCH: AOX3, appropriate mood, affect normal      Results Review:     I reviewed the patient's new clinical results.    Lab Results   Component Value Date    HGBA1C 9.3 (H) 02/02/2023    HGBA1C 8.6 (H) 08/27/2020    HGBA1C 9.0 (H) 03/20/2020      Lab Results   Component Value Date    GLUCOSE 437 (C) 02/02/2023    BUN 15 02/02/2023    CREATININE 0.67 02/02/2023    EGFRIFNONA 126 08/27/2020    EGFRIFAFRI >150 08/27/2020    BCR 22.4 02/02/2023    K 4.6 02/02/2023    CO2 26.0 02/02/2023    CALCIUM 9.4 02/02/2023    ALBUMIN 4.0 02/02/2023    LABIL2 0.8 (L) 12/10/2018    AST 14 02/02/2023    ALT 22 02/02/2023    CHOL 166 02/02/2023    TRIG 93 02/02/2023     (H) 02/02/2023    HDL 43 02/02/2023       Medication Review: Reviewed.       Current Outpatient Medications:   •  ACCU-CHEK FASTCLIX LANCETS misc, ACCU-CHEK FASTCLIX LANCETS, Disp: , Rfl:   •  acetone, urine, test strip, Use to test for ketones as needed, Disp: 50 strip, Rfl: 1  •  B-D UF III MINI PEN NEEDLES 31G X 5 MM misc, USED TO INJECT INSULIN 4 TIMES A DAY, Disp: 100 each, Rfl: 6  •  Blood Glucose Monitoring Suppl (Accu-Chek Magali Plus) w/Device kit, Use as instructed to monitor blood sugar levels, Disp: 1 kit, Rfl: 1  •  [employer] Glucose 4-6 GM-MG chewable tablet chewable tablet, CHEW 4 TABLETS AS NEEDED FOR LOW BLOOD SUGAR., Disp: 100 tablet, Rfl: 6  •  glucose (DEX4) 4 GM chewable tablet, Chew 4 tablets As Needed for Low Blood Sugar., Disp: 100 tablet, Rfl: 6  •  glucose blood (Accu-Chek Magali Plus) test strip, CHECK BLOOD SUGARS 4 TIMES A DAY, Disp: 200 each, Rfl: 6  •  insulin aspart (NovoLOG FlexPen) 100 UNIT/ML solution pen-injector sc pen, INJECT 1 UNIT OF EACH 5  G OF CARBOHYDRATE BEFORE EACH MEAL *MAX DOSE 50 UNITS, Disp: 15 mL, Rfl: 3  •  insulin detemir (Levemir FlexTouch) 100 UNIT/ML injection, Inject 30 Units under the skin into the appropriate area as directed 2 (Two) Times a Day., Disp: 15 mL, Rfl: 0          Assessment and plan:  1.  Diabetes mellitus type 1 with Hyperglycemia: Uncontrolled with high A1c of 9.3% as well as average blood sugar is 200.  She was off Levemir for about a month but now her insurance is covering both Levemir and NovoLog.  I will continue Levemir 30 units twice a day with NovoLog 1 unit for each 5 g of carb before meals but I will add NovoLog sliding scale 1 unit for each 30 mg blood sugar over 140 at meals.  She will definitely benefit from continuous glucose monitoring system.  She has type 1 diabetes and she is will be adjusting the insulin based upon the blood sugar records.  I will send a Dexcom monitoring system prescription.  She is advised to always keep glucose source in case of low blood sugars and recommend annual eye exam.    2.  Hyperlipidemia: LDL mild high, she is not interested in taking medications at this time but will be working on her diet and activity.  We will follow lipid panel               Jody Saldana MD FACE.

## 2023-03-14 DIAGNOSIS — E10.65 TYPE 1 DIABETES MELLITUS WITH HYPERGLYCEMIA: Primary | ICD-10-CM

## 2023-05-18 DIAGNOSIS — E10.65 TYPE 1 DIABETES MELLITUS WITH HYPERGLYCEMIA: ICD-10-CM

## 2023-05-18 RX ORDER — INSULIN ASPART 100 [IU]/ML
INJECTION, SOLUTION INTRAVENOUS; SUBCUTANEOUS
Qty: 30 ML | Refills: 6 | Status: SHIPPED | OUTPATIENT
Start: 2023-05-18

## 2023-05-22 DIAGNOSIS — E10.65 TYPE 1 DIABETES MELLITUS WITH HYPERGLYCEMIA: Primary | ICD-10-CM

## 2023-05-22 RX ORDER — INSULIN DETEMIR 100 [IU]/ML
30 INJECTION, SOLUTION SUBCUTANEOUS 2 TIMES DAILY
Qty: 54 ML | Refills: 3 | Status: SHIPPED | OUTPATIENT
Start: 2023-05-22

## 2023-06-01 DIAGNOSIS — E10.65 TYPE 1 DIABETES MELLITUS WITH HYPERGLYCEMIA: ICD-10-CM

## 2023-06-01 RX ORDER — PROCHLORPERAZINE 25 MG/1
1 SUPPOSITORY RECTAL
Qty: 1 EACH | Refills: 2 | Status: SHIPPED | OUTPATIENT
Start: 2023-06-01

## 2023-06-01 RX ORDER — FLURBIPROFEN SODIUM 0.3 MG/ML
SOLUTION/ DROPS OPHTHALMIC
Qty: 100 EACH | Refills: 6 | Status: SHIPPED | OUTPATIENT
Start: 2023-06-01

## 2023-07-24 DIAGNOSIS — E10.65 TYPE 1 DIABETES MELLITUS WITH HYPERGLYCEMIA: ICD-10-CM

## 2023-07-24 RX ORDER — INSULIN ASPART 100 [IU]/ML
INJECTION, SOLUTION INTRAVENOUS; SUBCUTANEOUS
Qty: 30 ML | Refills: 6 | Status: ON HOLD | OUTPATIENT
Start: 2023-07-24 | End: 2023-07-26 | Stop reason: SDUPTHER

## 2023-07-24 RX ORDER — FLURBIPROFEN SODIUM 0.3 MG/ML
SOLUTION/ DROPS OPHTHALMIC
Qty: 100 EACH | Refills: 6 | Status: ON HOLD | OUTPATIENT
Start: 2023-07-24 | End: 2023-07-26

## 2023-07-24 NOTE — TELEPHONE ENCOUNTER
Pt is running out of insulin wants to increase MDD. Pt states she uses 10-15 units extra. Please advise.

## 2023-07-25 ENCOUNTER — APPOINTMENT (OUTPATIENT)
Dept: GENERAL RADIOLOGY | Facility: HOSPITAL | Age: 43
DRG: 638 | End: 2023-07-25
Payer: COMMERCIAL

## 2023-07-25 ENCOUNTER — HOSPITAL ENCOUNTER (INPATIENT)
Facility: HOSPITAL | Age: 43
LOS: 2 days | Discharge: HOME | DRG: 638 | End: 2023-07-27
Attending: EMERGENCY MEDICINE | Admitting: INTERNAL MEDICINE
Payer: COMMERCIAL

## 2023-07-25 DIAGNOSIS — E10.65 TYPE 1 DIABETES MELLITUS WITH HYPERGLYCEMIA: ICD-10-CM

## 2023-07-25 DIAGNOSIS — E10.10 DIABETIC KETOACIDOSIS WITHOUT COMA ASSOCIATED WITH TYPE 1 DIABETES MELLITUS: Primary | ICD-10-CM

## 2023-07-25 PROBLEM — E11.10 DKA (DIABETIC KETOACIDOSIS): Status: ACTIVE | Noted: 2023-07-25

## 2023-07-25 LAB
ACETONE BLD QL: ABNORMAL
ALBUMIN SERPL-MCNC: 4.6 G/DL (ref 3.5–5.2)
ALBUMIN/GLOB SERPL: 1.2 G/DL
ALP SERPL-CCNC: 86 U/L (ref 39–117)
ALT SERPL W P-5'-P-CCNC: 24 U/L (ref 1–33)
ANION GAP SERPL CALCULATED.3IONS-SCNC: 21 MMOL/L (ref 5–15)
ANION GAP SERPL CALCULATED.3IONS-SCNC: 27 MMOL/L (ref 5–15)
AST SERPL-CCNC: 16 U/L (ref 1–32)
ATMOSPHERIC PRESS: ABNORMAL MM[HG]
B-HCG UR QL: NEGATIVE
BACTERIA UR QL AUTO: ABNORMAL /HPF
BASE EXCESS BLDV CALC-SCNC: -15.1 MMOL/L (ref -2–2)
BASOPHILS # BLD AUTO: 0.1 10*3/MM3 (ref 0–0.2)
BASOPHILS # BLD AUTO: 0.2 10*3/MM3 (ref 0–0.2)
BASOPHILS NFR BLD AUTO: 0.7 % (ref 0–1.5)
BASOPHILS NFR BLD AUTO: 1.4 % (ref 0–1.5)
BDY SITE: ABNORMAL
BILIRUB SERPL-MCNC: 0.8 MG/DL (ref 0–1.2)
BILIRUB UR QL STRIP: NEGATIVE
BUN SERPL-MCNC: 18 MG/DL (ref 6–20)
BUN SERPL-MCNC: 21 MG/DL (ref 6–20)
BUN/CREAT SERPL: 20.8 (ref 7–25)
BUN/CREAT SERPL: 22 (ref 7–25)
CALCIUM SPEC-SCNC: 8.5 MG/DL (ref 8.6–10.5)
CALCIUM SPEC-SCNC: 9.5 MG/DL (ref 8.6–10.5)
CHLORIDE SERPL-SCNC: 104 MMOL/L (ref 98–107)
CHLORIDE SERPL-SCNC: 90 MMOL/L (ref 98–107)
CLARITY UR: CLEAR
CO2 BLDA-SCNC: 13.4 MMOL/L (ref 22–29)
CO2 SERPL-SCNC: 10 MMOL/L (ref 22–29)
CO2 SERPL-SCNC: 11 MMOL/L (ref 22–29)
COLOR UR: YELLOW
CREAT SERPL-MCNC: 0.82 MG/DL (ref 0.57–1)
CREAT SERPL-MCNC: 1.01 MG/DL (ref 0.57–1)
DEPRECATED RDW RBC AUTO: 42.4 FL (ref 37–54)
DEPRECATED RDW RBC AUTO: 43.3 FL (ref 37–54)
EGFRCR SERPLBLD CKD-EPI 2021: 71.4 ML/MIN/1.73
EGFRCR SERPLBLD CKD-EPI 2021: 91.7 ML/MIN/1.73
EOSINOPHIL # BLD AUTO: 0 10*3/MM3 (ref 0–0.4)
EOSINOPHIL # BLD AUTO: 0 10*3/MM3 (ref 0–0.4)
EOSINOPHIL NFR BLD AUTO: 0 % (ref 0.3–6.2)
EOSINOPHIL NFR BLD AUTO: 0.1 % (ref 0.3–6.2)
ERYTHROCYTE [DISTWIDTH] IN BLOOD BY AUTOMATED COUNT: 14 % (ref 12.3–15.4)
ERYTHROCYTE [DISTWIDTH] IN BLOOD BY AUTOMATED COUNT: 14 % (ref 12.3–15.4)
GEN 5 2HR TROPONIN T REFLEX: 11 NG/L
GLOBULIN UR ELPH-MCNC: 4 GM/DL
GLUCOSE BLDC GLUCOMTR-MCNC: 210 MG/DL (ref 70–105)
GLUCOSE BLDC GLUCOMTR-MCNC: 210 MG/DL (ref 70–105)
GLUCOSE BLDC GLUCOMTR-MCNC: 236 MG/DL (ref 70–105)
GLUCOSE BLDC GLUCOMTR-MCNC: 283 MG/DL (ref 70–105)
GLUCOSE BLDC GLUCOMTR-MCNC: 313 MG/DL (ref 70–105)
GLUCOSE BLDC GLUCOMTR-MCNC: 333 MG/DL (ref 70–105)
GLUCOSE BLDC GLUCOMTR-MCNC: 397 MG/DL (ref 70–105)
GLUCOSE BLDC GLUCOMTR-MCNC: 416 MG/DL (ref 70–105)
GLUCOSE SERPL-MCNC: 303 MG/DL (ref 65–99)
GLUCOSE SERPL-MCNC: 447 MG/DL (ref 65–99)
GLUCOSE UR STRIP-MCNC: ABNORMAL MG/DL
HBA1C MFR BLD: 9.2 % (ref 4.8–5.6)
HCO3 BLDV-SCNC: 12.4 MMOL/L (ref 22–26)
HCT VFR BLD AUTO: 35.9 % (ref 34–46.6)
HCT VFR BLD AUTO: 39.4 % (ref 34–46.6)
HGB BLD-MCNC: 11.5 G/DL (ref 12–15.9)
HGB BLD-MCNC: 12.8 G/DL (ref 12–15.9)
HGB UR QL STRIP.AUTO: NEGATIVE
HYALINE CASTS UR QL AUTO: ABNORMAL /LPF
INHALED O2 CONCENTRATION: 21 %
KETONES UR QL STRIP: ABNORMAL
LEUKOCYTE ESTERASE UR QL STRIP.AUTO: NEGATIVE
LIPASE SERPL-CCNC: 15 U/L (ref 13–60)
LYMPHOCYTES # BLD AUTO: 0.9 10*3/MM3 (ref 0.7–3.1)
LYMPHOCYTES # BLD AUTO: 1.4 10*3/MM3 (ref 0.7–3.1)
LYMPHOCYTES NFR BLD AUTO: 13.2 % (ref 19.6–45.3)
LYMPHOCYTES NFR BLD AUTO: 7.1 % (ref 19.6–45.3)
MAGNESIUM SERPL-MCNC: 2 MG/DL (ref 1.6–2.6)
MAGNESIUM SERPL-MCNC: 2 MG/DL (ref 1.6–2.6)
MCH RBC QN AUTO: 28 PG (ref 26.6–33)
MCH RBC QN AUTO: 28.6 PG (ref 26.6–33)
MCHC RBC AUTO-ENTMCNC: 32.2 G/DL (ref 31.5–35.7)
MCHC RBC AUTO-ENTMCNC: 32.6 G/DL (ref 31.5–35.7)
MCV RBC AUTO: 87 FL (ref 79–97)
MCV RBC AUTO: 87.6 FL (ref 79–97)
MODALITY: ABNORMAL
MONOCYTES # BLD AUTO: 0.4 10*3/MM3 (ref 0.1–0.9)
MONOCYTES # BLD AUTO: 1 10*3/MM3 (ref 0.1–0.9)
MONOCYTES NFR BLD AUTO: 3.2 % (ref 5–12)
MONOCYTES NFR BLD AUTO: 9.2 % (ref 5–12)
MRSA DNA SPEC QL NAA+PROBE: NORMAL
NEUTROPHILS NFR BLD AUTO: 11.5 10*3/MM3 (ref 1.7–7)
NEUTROPHILS NFR BLD AUTO: 76.9 % (ref 42.7–76)
NEUTROPHILS NFR BLD AUTO: 8 10*3/MM3 (ref 1.7–7)
NEUTROPHILS NFR BLD AUTO: 88.2 % (ref 42.7–76)
NITRITE UR QL STRIP: NEGATIVE
NRBC BLD AUTO-RTO: 0 /100 WBC (ref 0–0.2)
NRBC BLD AUTO-RTO: 0 /100 WBC (ref 0–0.2)
OSMOLALITY SERPL: 306 MOSM/KG (ref 275–295)
PCO2 BLDV: 34 MM HG (ref 42–51)
PH BLDV: 7.17 PH UNITS (ref 7.32–7.43)
PH UR STRIP.AUTO: 5.5 [PH] (ref 5–8)
PHOSPHATE SERPL-MCNC: 3.7 MG/DL (ref 2.5–4.5)
PHOSPHATE SERPL-MCNC: 5.1 MG/DL (ref 2.5–4.5)
PLATELET # BLD AUTO: 272 10*3/MM3 (ref 140–450)
PLATELET # BLD AUTO: 330 10*3/MM3 (ref 140–450)
PMV BLD AUTO: 8.4 FL (ref 6–12)
PMV BLD AUTO: 9.6 FL (ref 6–12)
PO2 BLDV: 51.5 MM HG (ref 40–42)
POTASSIUM SERPL-SCNC: 5 MMOL/L (ref 3.5–5.2)
POTASSIUM SERPL-SCNC: 5 MMOL/L (ref 3.5–5.2)
PROT SERPL-MCNC: 8.6 G/DL (ref 6–8.5)
PROT UR QL STRIP: ABNORMAL
RBC # BLD AUTO: 4.12 10*6/MM3 (ref 3.77–5.28)
RBC # BLD AUTO: 4.49 10*6/MM3 (ref 3.77–5.28)
RBC # UR STRIP: ABNORMAL /HPF
REF LAB TEST METHOD: ABNORMAL
SAO2 % BLDCOV: 76.7 % (ref 45–75)
SODIUM SERPL-SCNC: 128 MMOL/L (ref 136–145)
SODIUM SERPL-SCNC: 135 MMOL/L (ref 136–145)
SP GR UR STRIP: 1.02 (ref 1–1.03)
SQUAMOUS #/AREA URNS HPF: ABNORMAL /HPF
TROPONIN T DELTA: 2 NG/L
TROPONIN T SERPL HS-MCNC: 9 NG/L
UROBILINOGEN UR QL STRIP: ABNORMAL
WBC # UR STRIP: ABNORMAL /HPF
WBC NRBC COR # BLD: 10.4 10*3/MM3 (ref 3.4–10.8)
WBC NRBC COR # BLD: 13 10*3/MM3 (ref 3.4–10.8)

## 2023-07-25 PROCEDURE — 82948 REAGENT STRIP/BLOOD GLUCOSE: CPT

## 2023-07-25 PROCEDURE — 80053 COMPREHEN METABOLIC PANEL: CPT | Performed by: EMERGENCY MEDICINE

## 2023-07-25 PROCEDURE — 81001 URINALYSIS AUTO W/SCOPE: CPT | Performed by: EMERGENCY MEDICINE

## 2023-07-25 PROCEDURE — 82803 BLOOD GASES ANY COMBINATION: CPT

## 2023-07-25 PROCEDURE — 25010000002 SODIUM CHLORIDE 0.9 % WITH KCL 20 MEQ 20-0.9 MEQ/L-% SOLUTION: Performed by: EMERGENCY MEDICINE

## 2023-07-25 PROCEDURE — 25010000002 ONDANSETRON PER 1 MG: Performed by: EMERGENCY MEDICINE

## 2023-07-25 PROCEDURE — 71045 X-RAY EXAM CHEST 1 VIEW: CPT

## 2023-07-25 PROCEDURE — 36415 COLL VENOUS BLD VENIPUNCTURE: CPT | Performed by: EMERGENCY MEDICINE

## 2023-07-25 PROCEDURE — 84100 ASSAY OF PHOSPHORUS: CPT | Performed by: EMERGENCY MEDICINE

## 2023-07-25 PROCEDURE — 83930 ASSAY OF BLOOD OSMOLALITY: CPT | Performed by: EMERGENCY MEDICINE

## 2023-07-25 PROCEDURE — 99285 EMERGENCY DEPT VISIT HI MDM: CPT

## 2023-07-25 PROCEDURE — 84484 ASSAY OF TROPONIN QUANT: CPT | Performed by: EMERGENCY MEDICINE

## 2023-07-25 PROCEDURE — 85025 COMPLETE CBC W/AUTO DIFF WBC: CPT | Performed by: NURSE PRACTITIONER

## 2023-07-25 PROCEDURE — 83690 ASSAY OF LIPASE: CPT | Performed by: EMERGENCY MEDICINE

## 2023-07-25 PROCEDURE — 85025 COMPLETE CBC W/AUTO DIFF WBC: CPT | Performed by: EMERGENCY MEDICINE

## 2023-07-25 PROCEDURE — 83036 HEMOGLOBIN GLYCOSYLATED A1C: CPT | Performed by: EMERGENCY MEDICINE

## 2023-07-25 PROCEDURE — 83735 ASSAY OF MAGNESIUM: CPT | Performed by: EMERGENCY MEDICINE

## 2023-07-25 PROCEDURE — 81025 URINE PREGNANCY TEST: CPT | Performed by: EMERGENCY MEDICINE

## 2023-07-25 PROCEDURE — 82009 KETONE BODYS QUAL: CPT | Performed by: EMERGENCY MEDICINE

## 2023-07-25 PROCEDURE — 87641 MR-STAPH DNA AMP PROBE: CPT | Performed by: NURSE PRACTITIONER

## 2023-07-25 PROCEDURE — 80061 LIPID PANEL: CPT | Performed by: NURSE PRACTITIONER

## 2023-07-25 PROCEDURE — 93005 ELECTROCARDIOGRAM TRACING: CPT | Performed by: EMERGENCY MEDICINE

## 2023-07-25 RX ORDER — DEXTROSE AND SODIUM CHLORIDE 5; .45 G/100ML; G/100ML
150 INJECTION, SOLUTION INTRAVENOUS CONTINUOUS PRN
Status: DISCONTINUED | OUTPATIENT
Start: 2023-07-25 | End: 2023-07-26

## 2023-07-25 RX ORDER — DEXTROSE MONOHYDRATE, SODIUM CHLORIDE, AND POTASSIUM CHLORIDE 50; 2.98; 9 G/1000ML; G/1000ML; G/1000ML
150 INJECTION, SOLUTION INTRAVENOUS CONTINUOUS PRN
Status: DISCONTINUED | OUTPATIENT
Start: 2023-07-25 | End: 2023-07-26

## 2023-07-25 RX ORDER — DEXTROSE AND SODIUM CHLORIDE 5; .9 G/100ML; G/100ML
150 INJECTION, SOLUTION INTRAVENOUS CONTINUOUS PRN
Status: DISCONTINUED | OUTPATIENT
Start: 2023-07-25 | End: 2023-07-26

## 2023-07-25 RX ORDER — BISACODYL 5 MG/1
5 TABLET, DELAYED RELEASE ORAL DAILY PRN
Status: DISCONTINUED | OUTPATIENT
Start: 2023-07-25 | End: 2023-07-27 | Stop reason: HOSPADM

## 2023-07-25 RX ORDER — NITROGLYCERIN 0.4 MG/1
0.4 TABLET SUBLINGUAL
Status: DISCONTINUED | OUTPATIENT
Start: 2023-07-25 | End: 2023-07-27 | Stop reason: HOSPADM

## 2023-07-25 RX ORDER — SODIUM CHLORIDE 9 MG/ML
250 INJECTION, SOLUTION INTRAVENOUS CONTINUOUS PRN
Status: DISCONTINUED | OUTPATIENT
Start: 2023-07-25 | End: 2023-07-26

## 2023-07-25 RX ORDER — SODIUM CHLORIDE AND POTASSIUM CHLORIDE 300; 900 MG/100ML; MG/100ML
250 INJECTION, SOLUTION INTRAVENOUS CONTINUOUS PRN
Status: DISCONTINUED | OUTPATIENT
Start: 2023-07-25 | End: 2023-07-26

## 2023-07-25 RX ORDER — AMOXICILLIN 500 MG/1
500 CAPSULE ORAL 3 TIMES DAILY
COMMUNITY
End: 2023-07-27

## 2023-07-25 RX ORDER — DEXTROSE MONOHYDRATE, SODIUM CHLORIDE, AND POTASSIUM CHLORIDE 50; 1.49; 9 G/1000ML; G/1000ML; G/1000ML
150 INJECTION, SOLUTION INTRAVENOUS CONTINUOUS PRN
Status: DISCONTINUED | OUTPATIENT
Start: 2023-07-25 | End: 2023-07-26

## 2023-07-25 RX ORDER — ONDANSETRON 2 MG/ML
4 INJECTION INTRAMUSCULAR; INTRAVENOUS EVERY 6 HOURS PRN
Status: DISCONTINUED | OUTPATIENT
Start: 2023-07-25 | End: 2023-07-27 | Stop reason: HOSPADM

## 2023-07-25 RX ORDER — POLYETHYLENE GLYCOL 3350 17 G/17G
17 POWDER, FOR SOLUTION ORAL DAILY PRN
Status: DISCONTINUED | OUTPATIENT
Start: 2023-07-25 | End: 2023-07-27 | Stop reason: HOSPADM

## 2023-07-25 RX ORDER — SODIUM CHLORIDE 9 MG/ML
40 INJECTION, SOLUTION INTRAVENOUS AS NEEDED
Status: DISCONTINUED | OUTPATIENT
Start: 2023-07-25 | End: 2023-07-27 | Stop reason: HOSPADM

## 2023-07-25 RX ORDER — AMOXICILLIN 250 MG
2 CAPSULE ORAL 2 TIMES DAILY
Status: DISCONTINUED | OUTPATIENT
Start: 2023-07-25 | End: 2023-07-27 | Stop reason: HOSPADM

## 2023-07-25 RX ORDER — PANTOPRAZOLE SODIUM 40 MG/10ML
40 INJECTION, POWDER, LYOPHILIZED, FOR SOLUTION INTRAVENOUS ONCE
Status: COMPLETED | OUTPATIENT
Start: 2023-07-25 | End: 2023-07-25

## 2023-07-25 RX ORDER — HEPARIN SODIUM 5000 [USP'U]/ML
5000 INJECTION, SOLUTION INTRAVENOUS; SUBCUTANEOUS EVERY 8 HOURS SCHEDULED
Status: DISCONTINUED | OUTPATIENT
Start: 2023-07-25 | End: 2023-07-27 | Stop reason: HOSPADM

## 2023-07-25 RX ORDER — SODIUM CHLORIDE 0.9 % (FLUSH) 0.9 %
10 SYRINGE (ML) INJECTION EVERY 12 HOURS SCHEDULED
Status: DISCONTINUED | OUTPATIENT
Start: 2023-07-25 | End: 2023-07-27 | Stop reason: HOSPADM

## 2023-07-25 RX ORDER — SODIUM CHLORIDE 0.9 % (FLUSH) 0.9 %
10 SYRINGE (ML) INJECTION AS NEEDED
Status: DISCONTINUED | OUTPATIENT
Start: 2023-07-25 | End: 2023-07-27 | Stop reason: HOSPADM

## 2023-07-25 RX ORDER — BISACODYL 10 MG
10 SUPPOSITORY, RECTAL RECTAL DAILY PRN
Status: DISCONTINUED | OUTPATIENT
Start: 2023-07-25 | End: 2023-07-27 | Stop reason: HOSPADM

## 2023-07-25 RX ORDER — SODIUM CHLORIDE AND POTASSIUM CHLORIDE 150; 450 MG/100ML; MG/100ML
250 INJECTION, SOLUTION INTRAVENOUS CONTINUOUS PRN
Status: DISCONTINUED | OUTPATIENT
Start: 2023-07-25 | End: 2023-07-26

## 2023-07-25 RX ORDER — ACETAMINOPHEN 325 MG/1
650 TABLET ORAL EVERY 4 HOURS PRN
Status: DISCONTINUED | OUTPATIENT
Start: 2023-07-25 | End: 2023-07-27 | Stop reason: HOSPADM

## 2023-07-25 RX ORDER — DEXTROSE MONOHYDRATE, SODIUM CHLORIDE, AND POTASSIUM CHLORIDE 50; 2.98; 4.5 G/1000ML; G/1000ML; G/1000ML
150 INJECTION, SOLUTION INTRAVENOUS CONTINUOUS PRN
Status: DISCONTINUED | OUTPATIENT
Start: 2023-07-25 | End: 2023-07-26

## 2023-07-25 RX ORDER — FAMOTIDINE 10 MG/ML
20 INJECTION, SOLUTION INTRAVENOUS DAILY
Status: DISCONTINUED | OUTPATIENT
Start: 2023-07-25 | End: 2023-07-27

## 2023-07-25 RX ORDER — SODIUM CHLORIDE AND POTASSIUM CHLORIDE 150; 900 MG/100ML; MG/100ML
250 INJECTION, SOLUTION INTRAVENOUS CONTINUOUS PRN
Status: DISCONTINUED | OUTPATIENT
Start: 2023-07-25 | End: 2023-07-26

## 2023-07-25 RX ORDER — LORATADINE 10 MG/1
10 TABLET ORAL DAILY PRN
COMMUNITY

## 2023-07-25 RX ORDER — NICOTINE POLACRILEX 4 MG
15 LOZENGE BUCCAL
Status: DISCONTINUED | OUTPATIENT
Start: 2023-07-25 | End: 2023-07-26

## 2023-07-25 RX ORDER — IBUPROFEN 600 MG/1
1 TABLET ORAL
Status: DISCONTINUED | OUTPATIENT
Start: 2023-07-25 | End: 2023-07-26

## 2023-07-25 RX ORDER — SODIUM CHLORIDE 450 MG/100ML
250 INJECTION, SOLUTION INTRAVENOUS CONTINUOUS PRN
Status: DISCONTINUED | OUTPATIENT
Start: 2023-07-25 | End: 2023-07-26

## 2023-07-25 RX ORDER — ONDANSETRON 4 MG/1
4 TABLET, FILM COATED ORAL EVERY 6 HOURS PRN
Status: DISCONTINUED | OUTPATIENT
Start: 2023-07-25 | End: 2023-07-27 | Stop reason: HOSPADM

## 2023-07-25 RX ORDER — DEXTROSE MONOHYDRATE 25 G/50ML
10-50 INJECTION, SOLUTION INTRAVENOUS
Status: DISCONTINUED | OUTPATIENT
Start: 2023-07-25 | End: 2023-07-27 | Stop reason: HOSPADM

## 2023-07-25 RX ORDER — AMOXICILLIN 250 MG/1
500 CAPSULE ORAL 3 TIMES DAILY
Status: DISCONTINUED | OUTPATIENT
Start: 2023-07-25 | End: 2023-07-26

## 2023-07-25 RX ORDER — ALUMINA, MAGNESIA, AND SIMETHICONE 2400; 2400; 240 MG/30ML; MG/30ML; MG/30ML
15 SUSPENSION ORAL EVERY 6 HOURS PRN
Status: DISCONTINUED | OUTPATIENT
Start: 2023-07-25 | End: 2023-07-27 | Stop reason: HOSPADM

## 2023-07-25 RX ORDER — DEXTROSE MONOHYDRATE, SODIUM CHLORIDE, AND POTASSIUM CHLORIDE 50; 1.49; 4.5 G/1000ML; G/1000ML; G/1000ML
150 INJECTION, SOLUTION INTRAVENOUS CONTINUOUS PRN
Status: DISCONTINUED | OUTPATIENT
Start: 2023-07-25 | End: 2023-07-26

## 2023-07-25 RX ORDER — ONDANSETRON 2 MG/ML
4 INJECTION INTRAMUSCULAR; INTRAVENOUS ONCE
Status: COMPLETED | OUTPATIENT
Start: 2023-07-25 | End: 2023-07-25

## 2023-07-25 RX ADMIN — DEXTROSE MONOHYDRATE, SODIUM CHLORIDE, AND POTASSIUM CHLORIDE 150 ML/HR: 50; 4.5; 1.49 INJECTION, SOLUTION INTRAVENOUS at 21:53

## 2023-07-25 RX ADMIN — SENNOSIDES AND DOCUSATE SODIUM 2 TABLET: 50; 8.6 TABLET ORAL at 20:34

## 2023-07-25 RX ADMIN — Medication 10 ML: at 21:23

## 2023-07-25 RX ADMIN — PANTOPRAZOLE SODIUM 40 MG: 40 INJECTION, POWDER, LYOPHILIZED, FOR SOLUTION INTRAVENOUS at 15:56

## 2023-07-25 RX ADMIN — AMOXICILLIN 500 MG: 250 CAPSULE ORAL at 20:34

## 2023-07-25 RX ADMIN — SODIUM CHLORIDE AND POTASSIUM CHLORIDE 250 ML/HR: .9; .15 SOLUTION INTRAVENOUS at 18:13

## 2023-07-25 RX ADMIN — ONDANSETRON 4 MG: 2 INJECTION INTRAMUSCULAR; INTRAVENOUS at 15:56

## 2023-07-25 RX ADMIN — SODIUM CHLORIDE 1000 ML: 9 INJECTION, SOLUTION INTRAVENOUS at 15:46

## 2023-07-25 RX ADMIN — INSULIN HUMAN 3.4 UNITS/HR: 1 INJECTION, SOLUTION INTRAVENOUS at 17:07

## 2023-07-25 RX ADMIN — FAMOTIDINE 20 MG: 10 INJECTION INTRAVENOUS at 18:13

## 2023-07-25 RX ADMIN — SODIUM CHLORIDE 1000 ML: 9 INJECTION, SOLUTION INTRAVENOUS at 15:56

## 2023-07-25 NOTE — Clinical Note
Level of Care: Critical Care [6]   Admitting Physician: KWADWO ESPINOZA [[id number]]   Attending Physician: KWADWO ESPINOZA [[id number]]

## 2023-07-25 NOTE — ED PROVIDER NOTES
Subjective   History of Present Illness  Chief complaint: Patient is a type I diabetic who presents with vomiting and high blood glucose.  She has been out of her blood glucose for the last 4 days.  She tried to get a refill through her insurance company but has to go through her primary physician first.  She has an appointment this week with primary.  She started vomiting today and presented here to the emergency department.  No fever.  She is concerned about being in DKA and so presented here.  No abdominal pain or diarrhea.  No cough or cold symptoms.  She states that her blood glucose has been running to the point where she had to increase her insulin more over the last few months and then she is running out more quickly.    Context:    Duration: 4 days    Timing:    Severity: Severe    Associated Symptoms:        PCP:  LMP:    Review of Systems   Constitutional:  Negative for fever.   HENT: Negative.     Eyes:  Negative for photophobia.   Respiratory: Negative.     Cardiovascular: Negative.    Gastrointestinal:  Positive for nausea and vomiting. Negative for abdominal pain.   Genitourinary:  Positive for frequency.   Musculoskeletal: Negative.    Neurological: Negative.      Past Medical History:   Diagnosis Date    Diabetes mellitus type I     Hyperlipidemia        Allergies   Allergen Reactions    Codeine Other (See Comments)     Memory loss          Past Surgical History:   Procedure Laterality Date     SECTION         Family History   Problem Relation Age of Onset    Bipolar disorder Mother     Schizophrenia Mother     Ulcers Father     Asthma Sister        Social History     Socioeconomic History    Marital status:    Tobacco Use    Smoking status: Never    Smokeless tobacco: Never   Vaping Use    Vaping Use: Never used   Substance and Sexual Activity    Alcohol use: Never    Drug use: Defer    Sexual activity: Defer           Objective   Physical Exam  Vitals and nursing note reviewed.    Constitutional:       General: She is not in acute distress.  HENT:      Head: Normocephalic and atraumatic.   Eyes:      Extraocular Movements: Extraocular movements intact.      Pupils: Pupils are equal, round, and reactive to light.   Cardiovascular:      Rate and Rhythm: Tachycardia present. Rhythm irregular.   Pulmonary:      Effort: Pulmonary effort is normal.      Breath sounds: Normal breath sounds.   Abdominal:      Palpations: Abdomen is soft.      Tenderness: There is no abdominal tenderness.   Musculoskeletal:         General: No swelling.      Cervical back: Normal range of motion.   Skin:     General: Skin is warm and dry.   Neurological:      General: No focal deficit present.      Mental Status: She is alert and oriented to person, place, and time.   Psychiatric:         Mood and Affect: Mood normal.         Thought Content: Thought content normal.       Procedures           ED Course      Results for orders placed or performed during the hospital encounter of 07/25/23   Comprehensive Metabolic Panel    Specimen: Blood   Result Value Ref Range    Glucose 447 (C) 65 - 99 mg/dL    BUN 21 (H) 6 - 20 mg/dL    Creatinine 1.01 (H) 0.57 - 1.00 mg/dL    Sodium 128 (L) 136 - 145 mmol/L    Potassium 5.0 3.5 - 5.2 mmol/L    Chloride 90 (L) 98 - 107 mmol/L    CO2 11.0 (L) 22.0 - 29.0 mmol/L    Calcium 9.5 8.6 - 10.5 mg/dL    Total Protein 8.6 (H) 6.0 - 8.5 g/dL    Albumin 4.6 3.5 - 5.2 g/dL    ALT (SGPT) 24 1 - 33 U/L    AST (SGOT) 16 1 - 32 U/L    Alkaline Phosphatase 86 39 - 117 U/L    Total Bilirubin 0.8 0.0 - 1.2 mg/dL    Globulin 4.0 gm/dL    A/G Ratio 1.2 g/dL    BUN/Creatinine Ratio 20.8 7.0 - 25.0    Anion Gap 27.0 (H) 5.0 - 15.0 mmol/L    eGFR 71.4 >60.0 mL/min/1.73   Lipase    Specimen: Blood   Result Value Ref Range    Lipase 15 13 - 60 U/L   Urinalysis With Microscopic If Indicated (No Culture) - Urine, Clean Catch    Specimen: Urine, Clean Catch   Result Value Ref Range    Color, UA Yellow Yellow,  Straw    Appearance, UA Clear Clear    pH, UA 5.5 5.0 - 8.0    Specific Gravity, UA 1.023 1.005 - 1.030    Glucose, UA >=1000 mg/dL (3+) (A) Negative    Ketones, UA >=160 mg/dL (4+) (A) Negative    Bilirubin, UA Negative Negative    Blood, UA Negative Negative    Protein, UA 30 mg/dL (1+) (A) Negative    Leuk Esterase, UA Negative Negative    Nitrite, UA Negative Negative    Urobilinogen, UA 1.0 E.U./dL 0.2 - 1.0 E.U./dL   CBC Auto Differential    Specimen: Blood   Result Value Ref Range    WBC 13.00 (H) 3.40 - 10.80 10*3/mm3    RBC 4.49 3.77 - 5.28 10*6/mm3    Hemoglobin 12.8 12.0 - 15.9 g/dL    Hematocrit 39.4 34.0 - 46.6 %    MCV 87.6 79.0 - 97.0 fL    MCH 28.6 26.6 - 33.0 pg    MCHC 32.6 31.5 - 35.7 g/dL    RDW 14.0 12.3 - 15.4 %    RDW-SD 43.3 37.0 - 54.0 fl    MPV 9.6 6.0 - 12.0 fL    Platelets 330 140 - 450 10*3/mm3    Neutrophil % 88.2 (H) 42.7 - 76.0 %    Lymphocyte % 7.1 (L) 19.6 - 45.3 %    Monocyte % 3.2 (L) 5.0 - 12.0 %    Eosinophil % 0.1 (L) 0.3 - 6.2 %    Basophil % 1.4 0.0 - 1.5 %    Neutrophils, Absolute 11.50 (H) 1.70 - 7.00 10*3/mm3    Lymphocytes, Absolute 0.90 0.70 - 3.10 10*3/mm3    Monocytes, Absolute 0.40 0.10 - 0.90 10*3/mm3    Eosinophils, Absolute 0.00 0.00 - 0.40 10*3/mm3    Basophils, Absolute 0.20 0.00 - 0.20 10*3/mm3    nRBC 0.0 0.0 - 0.2 /100 WBC   Pregnancy, Urine - Urine, Clean Catch    Specimen: Urine, Clean Catch   Result Value Ref Range    HCG, Urine QL Negative Negative   Acetone    Specimen: Blood   Result Value Ref Range    Acetone Moderate (A) Negative   Blood Gas, Venous -    Specimen: Venous Blood   Result Value Ref Range    Site CentralLine     pH, Venous 7.169 (C) 7.320 - 7.430 pH Units    pCO2, Venous 34.0 (L) 42.0 - 51.0 mm Hg    pO2, Venous 51.5 (H) 40.0 - 42.0 mm Hg    HCO3, Venous 12.4 (L) 22.0 - 26.0 mmol/L    Base Excess, Venous -15.1 (L) -2.0 - 2.0 mmol/L    O2 Saturation, Venous 76.7 (H) 45.0 - 75.0 %    CO2 Content 13.4 (L) 22 - 29 mmol/L    Barometric Pressure  for Blood Gas      Modality Room Air     FIO2 21 %   Urinalysis, Microscopic Only - Urine, Clean Catch    Specimen: Urine, Clean Catch   Result Value Ref Range    RBC, UA 0-2 (A) None Seen /HPF    WBC, UA 0-2 (A) None Seen /HPF    Bacteria, UA None Seen None Seen /HPF    Squamous Epithelial Cells, UA 0-2 None Seen, 0-2 /HPF    Hyaline Casts, UA 13-20 None Seen /LPF    Methodology Manual Light Microscopy    POC Glucose Once    Specimen: Blood   Result Value Ref Range    Glucose 416 (C) 70 - 105 mg/dL   ECG 12 Lead Electrolyte Imbalance   Result Value Ref Range    QT Interval 325 ms          No radiology results for the last day                                  Medical Decision Making  Patient was seen evaluated for above problem  Critical care time 35 minutes    Differential diagnose includes but not limited to hyperglycemia, Dehydration, DKA    Patient does have a pH of 7.16.  She has an anion gap of 27.  Potassium is 5.  EKG interpreted by myself sinus tachycardia with a left atrial lodgment rate of 121.  She is persistently tachycardic here in the emergency department.  Acetones positive.  She is definitely in DKA at this point.  Appears as though she is trying to increase her insulin use and has been running out too early.  Insurance unwilling to fill it.  At this point time she is now in DKA.  Discussed with Drew in the intensive care unit and I believe that the cause is likely she is running out of her insulin too soon before her refills are ready.  He accepts patient to the intensive care unit.    Problems Addressed:  Diabetic ketoacidosis without coma associated with type 1 diabetes mellitus: complicated acute illness or injury    Amount and/or Complexity of Data Reviewed  Labs: ordered. Decision-making details documented in ED Course.     Details: Reviewed by myself  Radiology: ordered.  ECG/medicine tests: ordered and independent interpretation performed.     Details: Interpreted by myself as  above    Risk  OTC drugs.  Prescription drug management.  Drug therapy requiring intensive monitoring for toxicity.  Decision regarding hospitalization.    Critical Care  Total time providing critical care: 35 minutes      Final diagnoses:   None   DKA    ED Disposition  ED Disposition       None            No follow-up provider specified.       Medication List      No changes were made to your prescriptions during this visit.            Solomon Sloan,   07/25/23 7103

## 2023-07-25 NOTE — H&P
Critical Care History and Physical     Lisa Lu : 1980 MRN:[id number] LOS:0 ROOM: 2303/1     Reason for admission: DKA (diabetic ketoacidosis)     Assessment / Plan     Diabetic ketoacidosis in patient with type 1 diabetes mellitus without coma  Diabetes mellitus, type 1 with long-term insulin use, not well controlled  Likely due to needing meds adjusted, was unable to get insurance to pre-approve release of additional insulin, ran out at home.  Lipase 12, Acetone moderate.  Glucose 416 on admission.  VBG with pH 7.169/HCO3 12.4 on admission.  Hemoglobin R1i-[id number].  Previous A1c 9.3 (23)  IVF bolus and IVF per DKA protocol.  Insulin gtt initiated.  Electrolyte replacement per protocol.    Metabolic acidosis, increased anion gap  Secondary to DKA.  Continue IVF.  Monitor and trend labs.    Mild hyponatremia  Secondary to acute dehydration.  Continue IVF.  Monitor and trend labs.    Tooth abscess, recent  Planned dental procedure, on amoxicillin, continue per home regimen.  Planned procedure scheduled in near future.    Congenital blindness: Chronic and stable.  Fatty Liver: Chronic and stable.  Hyperlipidemia: Check lipid in AM, not on statin therapy.    Code Status (Patient has no pulse and is not breathing): CPR (Attempt to Resuscitate)  Medical Interventions (Patient has pulse or is breathing): Full Support  Release to patient: Routine Release       Nutrition:   NPO Diet NPO Type: Strict NPO     DVT prophylaxis:  Medical DVT prophylaxis orders are present.     History of Present illness     Lisa Lu is a 42 y.o. female with PMH of diabetes mellitus type 1 on insulin, fatty liver, hyperlipidemia, and congenital blindness, presented to the hospital for nausea with vomiting and elevated blood glucose, and was admitted with a principal diagnosis of DKA (diabetic ketoacidosis).  Patient reported that she has been out of her insulin for approximately 4 days, and she has attempted to obtain  a early refill through her insurance company, but was told she had to go through her primary physician first.  She attempted to schedule an appointment with her primary physician, which was for later this week, but unfortunately began vomiting today.  Due to this, she was concerned that she may be proceeding into DKA and presented to the ED for further treatment and evaluation.  Patient denied abdominal pain, cough, congestion, fever, chills, constipation or diarrhea. Patient's sees Dr. Saldana, Endocrinology.  The intensivist service was consulted for admission to ICU.  Patient was recently seen by a dentist and found to have a dental abscess.  She has been on amoxicillin for this, as she has a planned follow up appointment to have this taken care of.    ED course: Labs were reviewed with the following abnormalities: Sodium 128, chloride 90, CO2 11, anion gap 27, BUN 21, creatinine 1.01, glucose 416, acetone moderate, WBC 13, urine hCG-negative.  Urinalysis was negative for bacteria and WBC 0-2, with glucosuria & ketonuria.  VBG pH 7.169, PCO2 34, PO2 51.5, HCO3 12.4 with venous oxygen saturation of 76.7%.  Patient was found to be in DKA, and the DKA protocol was initiated.  Patient has been started on IV fluids as well as insulin drip.    ACP: No advance care planning documentation on file, patient is currently her own decision maker.  No additional decision-maker has been identified though it was discussed.    Patient was seen and examined on 07/25/23 at 17:33 EDT .    Subjective / Review of systems     Review of Systems   Constitutional:  Negative for chills and fever.   HENT:  Negative for congestion and sore throat.         Recently diagnosed dental abscess.   Respiratory:  Negative for cough and shortness of breath.    Cardiovascular:  Negative for chest pain and palpitations.   Gastrointestinal:  Positive for nausea and vomiting. Negative for abdominal distention, abdominal pain and constipation.   Endocrine:  Negative for cold intolerance and heat intolerance.   Genitourinary:  Negative for dysuria and flank pain.   Neurological:  Negative for dizziness and headaches.   Hematological:  Negative for adenopathy. Does not bruise/bleed easily.      Past Medical/Surgical/Social/Family History & Allergies     Past Medical History:   Diagnosis Date    Diabetes mellitus type I     Hyperlipidemia       Past Surgical History:   Procedure Laterality Date     SECTION        Social History     Socioeconomic History    Marital status:    Tobacco Use    Smoking status: Never    Smokeless tobacco: Never   Vaping Use    Vaping Use: Never used   Substance and Sexual Activity    Alcohol use: Never    Drug use: Defer    Sexual activity: Defer      Family History   Problem Relation Age of Onset    Bipolar disorder Mother     Schizophrenia Mother     Ulcers Father     Asthma Sister       Allergies   Allergen Reactions    Codeine Other (See Comments)     Memory loss         Social Determinants of Health     Tobacco Use: Low Risk     Smoking Tobacco Use: Never    Smokeless Tobacco Use: Never    Passive Exposure: Not on file   Alcohol Use: Not on file   Financial Resource Strain: Not on file   Food Insecurity: Not on file   Transportation Needs: Not on file   Physical Activity: Not on file   Stress: Not on file   Social Connections: Not on file   Intimate Partner Violence: Not on file   Depression: Not on file   Housing Stability: Not on file        Home Medications     Prior to Admission medications    Medication Sig Start Date End Date Taking? Authorizing Provider   amoxicillin (AMOXIL) 500 MG capsule Take 1 capsule by mouth 3 (Three) Times a Day.  23 Yes ProviderRob MD   glucose (DEX4) 4 GM chewable tablet Chew 4 tablets As Needed for Low Blood Sugar. 3/19/20  Yes Jody Saldana MD   insulin aspart (NovoLOG FlexPen) 100 UNIT/ML solution pen-injector sc pen INJECT 1 UNIT OF EACH 5 G OF CARBOHYDRATE BEFORE EACH  MEAL and sliding scale 1 unit for each unit of blood sugar over 140 at meals only. *MAX DOSE 100 UNITS 7/24/23  Yes Jody Saldana MD   insulin detemir (Levemir FlexPen) 100 UNIT/ML injection Inject 30 Units under the skin into the appropriate area as directed 2 (Two) Times a Day. 7/17/23  Yes Jody Saldana MD   loratadine (CLARITIN) 10 MG tablet Take 1 tablet by mouth Daily As Needed for Allergies.   Yes Provider, MD Rob   ACCU-CHEK FASTCLIX LANCETS misc ACCU-CHEK FASTCLIX LANCETS 5/24/18   Provider, MD Rob   acetone, urine, test strip Use to test for ketones as needed 12/24/20   Jody Saldana MD   Blood Glucose Monitoring Suppl (Accu-Chek Magali Plus) w/Device kit Use as instructed to monitor blood sugar levels 7/6/21   Jody Saldana MD   Continuous Blood Gluc  (Dexcom G6 ) device 1 Device Daily. 2/9/23   Jody Saldana MD   Continuous Blood Gluc Sensor (Dexcom G6 Sensor) Every 10 (Ten) Days. 2/9/23   Jody Saldana MD   Continuous Blood Gluc Transmit (Dexcom G6 Transmitter) misc 1 Device Every 3 (Three) Months. Change transmitter every 90 days. DX/E10.65 6/1/23   Jody Saldana MD   [facility] Glucose 4-6 GM-MG chewable tablet chewable tablet CHEW 4 TABLETS AS NEEDED FOR LOW BLOOD SUGAR. 4/12/21   Jody Saldana MD   glucose blood (Accu-Chek Magali Plus) test strip CHECK BLOOD SUGARS 4 TIMES A DAY 4/12/21   Jody Saldana MD   Insulin Pen Needle (B-D UF III MINI PEN NEEDLES) 31G X 5 MM misc Use to inject insulin. DX/E10.65 7/24/23   Jody Saldana MD        Objective / Physical Exam     Vital signs:  Temp: 98.1 °F (36.7 °C)  BP: 107/55  Heart Rate: (!) 122  Resp: 18  SpO2: 100 %  Weight: 69.3 kg (152 lb 12.5 oz)    Admission Weight: Weight: 69.3 kg (152 lb 12.5 oz)    Physical Exam  Vitals and nursing note reviewed.   Constitutional:       General: She is not in acute distress.     Appearance: Normal appearance. She is not ill-appearing, toxic-appearing or diaphoretic.   HENT:       Head: Normocephalic and atraumatic.      Nose: Nose normal. No congestion or rhinorrhea.      Mouth/Throat:      Mouth: Mucous membranes are moist.      Pharynx: Oropharynx is clear. No oropharyngeal exudate or posterior oropharyngeal erythema.   Eyes:      General: No scleral icterus.     Extraocular Movements: Extraocular movements intact.      Conjunctiva/sclera: Conjunctivae normal.      Pupils: Pupils are equal, round, and reactive to light.   Cardiovascular:      Rate and Rhythm: Regular rhythm. Tachycardia present.      Heart sounds: Normal heart sounds. No murmur heard.    No gallop.   Pulmonary:      Effort: Pulmonary effort is normal. No respiratory distress.      Breath sounds: Normal breath sounds. No wheezing, rhonchi or rales.   Abdominal:      General: Bowel sounds are normal. There is no distension.      Palpations: Abdomen is soft.      Tenderness: There is no abdominal tenderness.   Musculoskeletal:         General: No tenderness. Normal range of motion.      Cervical back: Normal range of motion and neck supple. No rigidity.      Right lower leg: No edema.      Left lower leg: No edema.   Skin:     General: Skin is warm and dry.      Findings: No rash.   Neurological:      General: No focal deficit present.      Mental Status: She is alert and oriented to person, place, and time. Mental status is at baseline.   Psychiatric:         Mood and Affect: Mood normal.         Behavior: Behavior normal.         Thought Content: Thought content normal.         Judgment: Judgment normal.        Labs     Results from last 7 days   Lab Units 07/25/23  1541   WBC 10*3/mm3 13.00*   HEMATOCRIT % 39.4   PLATELETS 10*3/mm3 330      Results from last 7 days   Lab Units 07/25/23  1541   SODIUM mmol/L 128*   POTASSIUM mmol/L 5.0   CHLORIDE mmol/L 90*   CO2 mmol/L 11.0*   BUN mg/dL 21*   CREATININE mg/dL 1.01*        Imaging     Chest X ray: My independent assessment showed no infiltrates or effusions    EKG: My  independent evaluation showed sinus tachycardia without ST or T wave abnormalities, heart rate 121, QTc 461.    Current Medications     Scheduled Meds:  famotidine, 20 mg, Intravenous, Daily  heparin (porcine), 5,000 Units, Subcutaneous, Q8H  senna-docusate sodium, 2 tablet, Oral, BID  sodium chloride, 10 mL, Intravenous, Q12H  sodium chloride, 10 mL, Intravenous, Q12H         Continuous Infusions:  dextrose 5 % and sodium chloride 0.45 %, 150 mL/hr  dextrose 5 % and sodium chloride 0.45 % with KCl 20 mEq/L, 150 mL/hr  dextrose 5 % and sodium chloride 0.45 % with KCl 40 mEq/L, 150 mL/hr  dextrose 5 % and sodium chloride 0.9 %, 150 mL/hr  dextrose 5 % and sodium chloride 0.9 % with KCl 20 mEq, 150 mL/hr  dextrose 5% and sodium chloride 0.9% with KCl 40 mEq/L, 150 mL/hr  insulin, 0-100 Units/hr, Last Rate: 3.4 Units/hr (07/25/23 1707)  custom IV KCl infusion builder, 250 mL/hr  sodium chloride, 250 mL/hr  sodium chloride 0.45 % with KCl 20 mEq, 250 mL/hr  sodium chloride, 1,000 mL/hr  sodium chloride, 250 mL/hr  sodium chloride 0.9 % with KCl 20 mEq, 250 mL/hr  sodium chloride 0.9 % with KCl 40 mEq/L, 250 mL/hr       Plan discussed with RN. Reviewed all other data in the last 24 hours, including but not limited to vitals, labs, microbiology, imaging and pertinent notes from other providers.  Plan also discussed with patient at the bedside.      ALLEN Valdivia   Critical Care  07/25/23   17:33 EDT

## 2023-07-25 NOTE — ED NOTES
"Nursing report ED to floor  [facility]  42 [facility]  [facility]    HPI:   Chief Complaint   Patient presents with    Hyperglycemia     Pt reports vomiting and states has been out of her insulin for the past 4 days.  Pt reports vomiting that started on Saturday and again today.  Pt worried she may be in DKA.        Admitting doctor:   Sameer Tanner MD    Admitting diagnosis:   The encounter diagnosis was Diabetic ketoacidosis without coma associated with type 1 diabetes mellitus.    Code status:   Current Code Status       Date Active Code Status Order ID Comments User Context       7/25/2023 1706 CPR (Attempt to Resuscitate) [id number]  Farhad Black APRN ED        Question Answer    Code Status (Patient has no pulse and is not breathing) CPR (Attempt to Resuscitate)    Medical Interventions (Patient has pulse or is breathing) Full Support    Release to patient Routine Release                    Allergies:   Codeine    Isolation:  No active isolations     Fall Risk:  Fall Risk Assessment was completed, and patient is at moderate risk for falls.   Predictive Model Details   No score data available for Risk of Fall        Weight:       07/25/23  1508   Weight: 69.3 kg (152 lb 12.5 oz)       Intake and Output  No intake or output data in the 24 hours ending 07/25/23 1721    Diet:   Dietary Orders (From admission, onward)       Start     Ordered    07/25/23 1629  NPO Diet NPO Type: Strict NPO  Diet Effective Now        Question:  NPO Type  Answer:  Strict NPO    07/25/23 1629                     Most recent vitals:   Vitals:    07/25/23 1508 07/25/23 1720   BP: 97/58 107/55   BP Location: Left arm    Patient Position: Sitting Lying   Pulse: (!) 127 (!) 122   Resp: 18    Temp: 98.1 °F (36.7 °C)    TempSrc: Oral    SpO2: 99% 100%   Weight: 69.3 kg (152 lb 12.5 oz)    Height: 157.5 cm (62\")        Active LDAs/IV Access:   Lines, Drains & Airways       Active LDAs       Name Placement date Placement time Site Days    " Peripheral IV 07/25/23 1546 Right Antecubital 07/25/23  1546  Antecubital  less than 1                    Skin Condition:   Skin Assessments (last day)       Date/Time Skin WDL    07/25/23 16:29:08 [facility]             Labs (abnormal labs have a star):   Labs Reviewed   COMPREHENSIVE METABOLIC PANEL - Abnormal; Notable for the following components:       Result Value    Glucose 447 (*)     BUN 21 (*)     Creatinine 1.01 (*)     Sodium 128 (*)     Chloride 90 (*)     CO2 11.0 (*)     Total Protein 8.6 (*)     Anion Gap 27.0 (*)     All other components within normal limits    Narrative:     GFR Normal >60  Chronic Kidney Disease <60  Kidney Failure <15     URINALYSIS W/ MICROSCOPIC IF INDICATED (NO CULTURE) - Abnormal; Notable for the following components:    Glucose, UA >=1000 mg/dL (3+) (*)     Ketones, UA >=160 mg/dL (4+) (*)     Protein, UA 30 mg/dL (1+) (*)     All other components within normal limits   CBC WITH AUTO DIFFERENTIAL - Abnormal; Notable for the following components:    WBC 13.00 (*)     Neutrophil % 88.2 (*)     Lymphocyte % 7.1 (*)     Monocyte % 3.2 (*)     Eosinophil % 0.1 (*)     Neutrophils, Absolute 11.50 (*)     All other components within normal limits   ACETONE - Abnormal; Notable for the following components:    Acetone Moderate (*)     All other components within normal limits   BLOOD GAS, VENOUS - Abnormal; Notable for the following components:    pH, Venous 7.169 (*)     pCO2, Venous 34.0 (*)     pO2, Venous 51.5 (*)     HCO3, Venous 12.4 (*)     Base Excess, Venous -15.1 (*)     O2 Saturation, Venous 76.7 (*)     CO2 Content 13.4 (*)     All other components within normal limits   URINALYSIS, MICROSCOPIC ONLY - Abnormal; Notable for the following components:    RBC, UA 0-2 (*)     WBC, UA 0-2 (*)     All other components within normal limits   POCT GLUCOSE FINGERSTICK - Abnormal; Notable for the following components:    Glucose 416 (*)     All other components within normal limits   POCT  GLUCOSE FINGERSTICK - Abnormal; Notable for the following components:    Glucose 397 (*)     All other components within normal limits   LIPASE - Normal   PREGNANCY, URINE - Normal   MRSA SCREEN, PCR   BLOOD GAS, VENOUS   PHOSPHORUS   MAGNESIUM   OSMOLALITY, SERUM   HEMOGLOBIN A1C   TROPONIN   BASIC METABOLIC PANEL   BASIC METABOLIC PANEL   MAGNESIUM   MAGNESIUM   PHOSPHORUS   PHOSPHORUS   CBC AND DIFFERENTIAL    Narrative:     The following orders were created for panel order CBC & Differential.  Procedure                               Abnormality         Status                     ---------                               -----------         ------                     CBC Auto Differential[[id number]]        Abnormal            Final result                 Please view results for these tests on the individual orders.       LOC: Person, Place, Time, and Situation    Telemetry:  Critical Care    Cardiac Monitoring Ordered: yes    EKG:   ECG 12 Lead Electrolyte Imbalance   Preliminary Result   HEART RATE= 121  bpm   RR Interval= 496  ms   MT Interval= 142  ms   P Horizontal Axis= -33  deg   P Front Axis= 59  deg   QRSD Interval= 87  ms   QT Interval= 325  ms   QRS Axis= 64  deg   T Wave Axis= -28  deg   - BORDERLINE ECG -   Sinus tachycardia   Probable left atrial enlargement   No previous ECG available for comparison   Electronically Signed By:    Date and Time of Study: 2023-07-25 16:06:20          Medications Given in the ED:   Medications   sodium chloride 0.9 % flush 10 mL (has no administration in time range)   sodium chloride 0.9 % flush 10 mL (has no administration in time range)   sodium chloride 0.9 % flush 10 mL (has no administration in time range)   sodium chloride 0.9 % infusion 40 mL (has no administration in time range)   dextrose (GLUTOSE) oral gel 15 g (has no administration in time range)   dextrose (D50W) (25 g/50 mL) IV injection 10-50 mL (has no administration in time range)   Glucagon (GLUCAGEN)  injection 1 mg (has no administration in time range)   sodium chloride 0.9 % bolus (has no administration in time range)   sodium chloride 0.9 % infusion (has no administration in time range)   sodium chloride 0.9 % with KCl 20 mEq/L infusion (has no administration in time range)   sodium chloride 0.9 % with KCl 40 mEq/L infusion (has no administration in time range)   dextrose 5 % and sodium chloride 0.9 % infusion (has no administration in time range)   dextrose 5 % and sodium chloride 0.9 % with KCl 20 mEq/L infusion (has no administration in time range)   dextrose 5 % and sodium chloride 0.9 % with KCl 40 mEq/L infusion (has no administration in time range)   sodium chloride 0.45 % infusion (has no administration in time range)   sodium chloride 0.45 % with KCl 20 mEq/L infusion (has no administration in time range)   sodium chloride 0.45 % 1,000 mL with potassium chloride 40 mEq infusion (has no administration in time range)   dextrose 5 % and sodium chloride 0.45 % infusion (has no administration in time range)   dextrose 5 % and sodium chloride 0.45 % with KCl 20 mEq/L infusion (has no administration in time range)   dextrose 5 % and sodium chloride 0.45 % with KCl 40 mEq/L infusion (has no administration in time range)   insulin regular 1 unit/mL in 0.9% sodium chloride (Glucommander) (3.4 Units/hr Intravenous New Bag 7/25/23 1707)   nitroglycerin (NITROSTAT) SL tablet 0.4 mg (has no administration in time range)   sodium chloride 0.9 % flush 10 mL (has no administration in time range)   sodium chloride 0.9 % flush 10 mL (has no administration in time range)   sodium chloride 0.9 % infusion 40 mL (has no administration in time range)   aluminum-magnesium hydroxide-simethicone (MAALOX MAX) 400-400-40 MG/5ML suspension 15 mL (has no administration in time range)   sennosides-docusate (PERICOLACE) 8.6-50 MG per tablet 2 tablet (has no administration in time range)     And   polyethylene glycol (MIRALAX) packet  17 g (has no administration in time range)     And   bisacodyl (DULCOLAX) EC tablet 5 mg (has no administration in time range)     And   bisacodyl (DULCOLAX) suppository 10 mg (has no administration in time range)   acetaminophen (TYLENOL) tablet 650 mg (has no administration in time range)   ondansetron (ZOFRAN) tablet 4 mg (has no administration in time range)     Or   ondansetron (ZOFRAN) injection 4 mg (has no administration in time range)   heparin (porcine) 5000 UNIT/ML injection 5,000 Units (has no administration in time range)   Potassium Replacement - Follow Nurse / BPA Driven Protocol (has no administration in time range)   Magnesium Cardiology Dose Replacement - Follow Nurse / BPA Driven Protocol (has no administration in time range)   Phosphorus Replacement - Follow Nurse / BPA Driven Protocol (has no administration in time range)   Calcium Replacement - Follow Nurse / BPA Driven Protocol (has no administration in time range)   sodium chloride 0.9 % bolus 1,000 mL (0 mL Intravenous Stopped 7/25/23 1720)   ondansetron (ZOFRAN) injection 4 mg (4 mg Intravenous Given 7/25/23 1556)   pantoprazole (PROTONIX) injection 40 mg (40 mg Intravenous Given 7/25/23 1556)   sodium chloride 0.9 % bolus 1,000 mL (0 mL Intravenous Stopped 7/25/23 1720)       Imaging results:  No radiology results for the last day    Social issues:   Social History     Socioeconomic History    Marital status:    Tobacco Use    Smoking status: Never    Smokeless tobacco: Never   Vaping Use    Vaping Use: Never used   Substance and Sexual Activity    Alcohol use: Never    Drug use: Defer    Sexual activity: Defer       NIH Stroke Scale:  Interval: (not recorded)  1a. Level of Consciousness: (not recorded)  1b. LOC Questions: (not recorded)  1c. LOC Commands: (not recorded)  2. Best Gaze: (not recorded)  3. Visual: (not recorded)  4. Facial Palsy: (not recorded)  5a. Motor Arm, Left: (not recorded)  5b. Motor Arm, Right: (not  recorded)  6a. Motor Leg, Left: (not recorded)  6b. Motor Leg, Right: (not recorded)  7. Limb Ataxia: (not recorded)  8. Sensory: (not recorded)  9. Best Language: (not recorded)  10. Dysarthria: (not recorded)  11. Extinction and Inattention (formerly Neglect): (not recorded)    Total (NIH Stroke Scale): (not recorded)     Additional notable assessment information:    Nursing report ED to floor:  Cali Iniguez RN   07/25/23 17:21 EDT

## 2023-07-26 ENCOUNTER — APPOINTMENT (OUTPATIENT)
Dept: CT IMAGING | Facility: HOSPITAL | Age: 43
DRG: 638 | End: 2023-07-26
Payer: COMMERCIAL

## 2023-07-26 LAB
ALBUMIN SERPL-MCNC: 3.4 G/DL (ref 3.5–5.2)
ALBUMIN SERPL-MCNC: 3.9 G/DL (ref 3.5–5.2)
ALBUMIN SERPL-MCNC: 3.9 G/DL (ref 3.5–5.2)
ALBUMIN/GLOB SERPL: 0.9 G/DL
ALBUMIN/GLOB SERPL: 1.1 G/DL
ALP SERPL-CCNC: 60 U/L (ref 39–117)
ALP SERPL-CCNC: 66 U/L (ref 39–117)
ALP SERPL-CCNC: 70 U/L (ref 39–117)
ALT SERPL W P-5'-P-CCNC: 14 U/L (ref 1–33)
ALT SERPL W P-5'-P-CCNC: 18 U/L (ref 1–33)
ALT SERPL W P-5'-P-CCNC: 18 U/L (ref 1–33)
ANION GAP SERPL CALCULATED.3IONS-SCNC: 11 MMOL/L (ref 5–15)
ANION GAP SERPL CALCULATED.3IONS-SCNC: 12 MMOL/L (ref 5–15)
ANION GAP SERPL CALCULATED.3IONS-SCNC: 15 MMOL/L (ref 5–15)
AST SERPL-CCNC: 10 U/L (ref 1–32)
AST SERPL-CCNC: 12 U/L (ref 1–32)
AST SERPL-CCNC: 16 U/L (ref 1–32)
BASOPHILS # BLD AUTO: 0.1 10*3/MM3 (ref 0–0.2)
BASOPHILS # BLD AUTO: 0.1 10*3/MM3 (ref 0–0.2)
BASOPHILS NFR BLD AUTO: 0.7 % (ref 0–1.5)
BASOPHILS NFR BLD AUTO: 1 % (ref 0–1.5)
BILIRUB CONJ SERPL-MCNC: <0.2 MG/DL (ref 0–0.3)
BILIRUB INDIRECT SERPL-MCNC: NORMAL MG/DL
BILIRUB SERPL-MCNC: 0.4 MG/DL (ref 0–1.2)
BILIRUB SERPL-MCNC: 0.7 MG/DL (ref 0–1.2)
BILIRUB SERPL-MCNC: 0.7 MG/DL (ref 0–1.2)
BUN SERPL-MCNC: 13 MG/DL (ref 6–20)
BUN SERPL-MCNC: 6 MG/DL (ref 6–20)
BUN SERPL-MCNC: 9 MG/DL (ref 6–20)
BUN/CREAT SERPL: 11.1 (ref 7–25)
BUN/CREAT SERPL: 16.1 (ref 7–25)
BUN/CREAT SERPL: 17.6 (ref 7–25)
CALCIUM SPEC-SCNC: 8.3 MG/DL (ref 8.6–10.5)
CALCIUM SPEC-SCNC: 8.5 MG/DL (ref 8.6–10.5)
CALCIUM SPEC-SCNC: 8.8 MG/DL (ref 8.6–10.5)
CHLORIDE SERPL-SCNC: 107 MMOL/L (ref 98–107)
CHLORIDE SERPL-SCNC: 109 MMOL/L (ref 98–107)
CHLORIDE SERPL-SCNC: 110 MMOL/L (ref 98–107)
CHOLEST SERPL-MCNC: 167 MG/DL (ref 0–200)
CO2 SERPL-SCNC: 12 MMOL/L (ref 22–29)
CO2 SERPL-SCNC: 15 MMOL/L (ref 22–29)
CO2 SERPL-SCNC: 17 MMOL/L (ref 22–29)
CREAT SERPL-MCNC: 0.54 MG/DL (ref 0.57–1)
CREAT SERPL-MCNC: 0.56 MG/DL (ref 0.57–1)
CREAT SERPL-MCNC: 0.74 MG/DL (ref 0.57–1)
DEPRECATED RDW RBC AUTO: 44.2 FL (ref 37–54)
DEPRECATED RDW RBC AUTO: 44.6 FL (ref 37–54)
EGFRCR SERPLBLD CKD-EPI 2021: 103.7 ML/MIN/1.73
EGFRCR SERPLBLD CKD-EPI 2021: 117 ML/MIN/1.73
EGFRCR SERPLBLD CKD-EPI 2021: 118.1 ML/MIN/1.73
EOSINOPHIL # BLD AUTO: 0 10*3/MM3 (ref 0–0.4)
EOSINOPHIL # BLD AUTO: 0 10*3/MM3 (ref 0–0.4)
EOSINOPHIL NFR BLD AUTO: 0.6 % (ref 0.3–6.2)
EOSINOPHIL NFR BLD AUTO: 0.8 % (ref 0.3–6.2)
ERYTHROCYTE [DISTWIDTH] IN BLOOD BY AUTOMATED COUNT: 14 % (ref 12.3–15.4)
ERYTHROCYTE [DISTWIDTH] IN BLOOD BY AUTOMATED COUNT: 14.1 % (ref 12.3–15.4)
GLOBULIN UR ELPH-MCNC: 3.6 GM/DL
GLOBULIN UR ELPH-MCNC: 3.7 GM/DL
GLUCOSE BLDC GLUCOMTR-MCNC: 102 MG/DL (ref 70–105)
GLUCOSE BLDC GLUCOMTR-MCNC: 107 MG/DL (ref 70–105)
GLUCOSE BLDC GLUCOMTR-MCNC: 120 MG/DL (ref 70–105)
GLUCOSE BLDC GLUCOMTR-MCNC: 122 MG/DL (ref 70–105)
GLUCOSE BLDC GLUCOMTR-MCNC: 125 MG/DL (ref 70–105)
GLUCOSE BLDC GLUCOMTR-MCNC: 139 MG/DL (ref 70–105)
GLUCOSE BLDC GLUCOMTR-MCNC: 152 MG/DL (ref 70–105)
GLUCOSE BLDC GLUCOMTR-MCNC: 152 MG/DL (ref 70–105)
GLUCOSE BLDC GLUCOMTR-MCNC: 167 MG/DL (ref 70–105)
GLUCOSE BLDC GLUCOMTR-MCNC: 169 MG/DL (ref 70–105)
GLUCOSE BLDC GLUCOMTR-MCNC: 170 MG/DL (ref 70–105)
GLUCOSE BLDC GLUCOMTR-MCNC: 201 MG/DL (ref 70–105)
GLUCOSE BLDC GLUCOMTR-MCNC: 201 MG/DL (ref 70–105)
GLUCOSE BLDC GLUCOMTR-MCNC: 203 MG/DL (ref 70–105)
GLUCOSE BLDC GLUCOMTR-MCNC: 216 MG/DL (ref 70–105)
GLUCOSE BLDC GLUCOMTR-MCNC: 230 MG/DL (ref 70–105)
GLUCOSE BLDC GLUCOMTR-MCNC: 264 MG/DL (ref 70–105)
GLUCOSE SERPL-MCNC: 106 MG/DL (ref 65–99)
GLUCOSE SERPL-MCNC: 141 MG/DL (ref 65–99)
GLUCOSE SERPL-MCNC: 224 MG/DL (ref 65–99)
HCT VFR BLD AUTO: 34.7 % (ref 34–46.6)
HCT VFR BLD AUTO: 34.7 % (ref 34–46.6)
HDLC SERPL-MCNC: 42 MG/DL (ref 40–60)
HGB BLD-MCNC: 11.2 G/DL (ref 12–15.9)
HGB BLD-MCNC: 11.2 G/DL (ref 12–15.9)
LDLC SERPL CALC-MCNC: 109 MG/DL (ref 0–100)
LDLC/HDLC SERPL: 2.56 {RATIO}
LYMPHOCYTES # BLD AUTO: 1.3 10*3/MM3 (ref 0.7–3.1)
LYMPHOCYTES # BLD AUTO: 1.7 10*3/MM3 (ref 0.7–3.1)
LYMPHOCYTES NFR BLD AUTO: 21.7 % (ref 19.6–45.3)
LYMPHOCYTES NFR BLD AUTO: 22.1 % (ref 19.6–45.3)
MAGNESIUM SERPL-MCNC: 2 MG/DL (ref 1.6–2.6)
MAGNESIUM SERPL-MCNC: 2 MG/DL (ref 1.6–2.6)
MCH RBC QN AUTO: 27.6 PG (ref 26.6–33)
MCH RBC QN AUTO: 27.9 PG (ref 26.6–33)
MCHC RBC AUTO-ENTMCNC: 32.2 G/DL (ref 31.5–35.7)
MCHC RBC AUTO-ENTMCNC: 32.4 G/DL (ref 31.5–35.7)
MCV RBC AUTO: 85.7 FL (ref 79–97)
MCV RBC AUTO: 86.1 FL (ref 79–97)
MONOCYTES # BLD AUTO: 0.6 10*3/MM3 (ref 0.1–0.9)
MONOCYTES # BLD AUTO: 1.1 10*3/MM3 (ref 0.1–0.9)
MONOCYTES NFR BLD AUTO: 10.3 % (ref 5–12)
MONOCYTES NFR BLD AUTO: 14.5 % (ref 5–12)
NEUTROPHILS NFR BLD AUTO: 4 10*3/MM3 (ref 1.7–7)
NEUTROPHILS NFR BLD AUTO: 4.8 10*3/MM3 (ref 1.7–7)
NEUTROPHILS NFR BLD AUTO: 62.1 % (ref 42.7–76)
NEUTROPHILS NFR BLD AUTO: 66.2 % (ref 42.7–76)
NRBC BLD AUTO-RTO: 0.1 /100 WBC (ref 0–0.2)
NRBC BLD AUTO-RTO: 0.2 /100 WBC (ref 0–0.2)
PHOSPHATE SERPL-MCNC: 1.4 MG/DL (ref 2.5–4.5)
PHOSPHATE SERPL-MCNC: 2.3 MG/DL (ref 2.5–4.5)
PLATELET # BLD AUTO: 223 10*3/MM3 (ref 140–450)
PLATELET # BLD AUTO: 247 10*3/MM3 (ref 140–450)
PMV BLD AUTO: 8.8 FL (ref 6–12)
PMV BLD AUTO: 8.8 FL (ref 6–12)
POTASSIUM SERPL-SCNC: 3.7 MMOL/L (ref 3.5–5.2)
POTASSIUM SERPL-SCNC: 4.1 MMOL/L (ref 3.5–5.2)
POTASSIUM SERPL-SCNC: 4.9 MMOL/L (ref 3.5–5.2)
PROT SERPL-MCNC: 7 G/DL (ref 6–8.5)
PROT SERPL-MCNC: 7.1 G/DL (ref 6–8.5)
PROT SERPL-MCNC: 7.5 G/DL (ref 6–8.5)
QT INTERVAL: 325 MS
RBC # BLD AUTO: 4.03 10*6/MM3 (ref 3.77–5.28)
RBC # BLD AUTO: 4.05 10*6/MM3 (ref 3.77–5.28)
SODIUM SERPL-SCNC: 135 MMOL/L (ref 136–145)
SODIUM SERPL-SCNC: 136 MMOL/L (ref 136–145)
SODIUM SERPL-SCNC: 137 MMOL/L (ref 136–145)
TRIGL SERPL-MCNC: 87 MG/DL (ref 0–150)
VLDLC SERPL-MCNC: 16 MG/DL (ref 5–40)
WBC NRBC COR # BLD: 6.1 10*3/MM3 (ref 3.4–10.8)
WBC NRBC COR # BLD: 7.7 10*3/MM3 (ref 3.4–10.8)

## 2023-07-26 PROCEDURE — 63710000001 INSULIN LISPRO (HUMAN) PER 5 UNITS: Performed by: NURSE PRACTITIONER

## 2023-07-26 PROCEDURE — 80053 COMPREHEN METABOLIC PANEL: CPT | Performed by: INTERNAL MEDICINE

## 2023-07-26 PROCEDURE — 84100 ASSAY OF PHOSPHORUS: CPT | Performed by: EMERGENCY MEDICINE

## 2023-07-26 PROCEDURE — 85025 COMPLETE CBC W/AUTO DIFF WBC: CPT | Performed by: INTERNAL MEDICINE

## 2023-07-26 PROCEDURE — 82948 REAGENT STRIP/BLOOD GLUCOSE: CPT

## 2023-07-26 PROCEDURE — 25010000002 ONDANSETRON PER 1 MG: Performed by: NURSE PRACTITIONER

## 2023-07-26 PROCEDURE — 83735 ASSAY OF MAGNESIUM: CPT | Performed by: EMERGENCY MEDICINE

## 2023-07-26 PROCEDURE — 82248 BILIRUBIN DIRECT: CPT | Performed by: INTERNAL MEDICINE

## 2023-07-26 PROCEDURE — 63710000001 INSULIN GLARGINE PER 5 UNITS: Performed by: NURSE PRACTITIONER

## 2023-07-26 PROCEDURE — 70487 CT MAXILLOFACIAL W/DYE: CPT

## 2023-07-26 PROCEDURE — 85025 COMPLETE CBC W/AUTO DIFF WBC: CPT | Performed by: NURSE PRACTITIONER

## 2023-07-26 PROCEDURE — 25010000002 HEPARIN (PORCINE) PER 1000 UNITS: Performed by: NURSE PRACTITIONER

## 2023-07-26 PROCEDURE — 25510000001 IOPAMIDOL PER 1 ML: Performed by: INTERNAL MEDICINE

## 2023-07-26 RX ORDER — SODIUM CHLORIDE 9 MG/ML
100 INJECTION, SOLUTION INTRAVENOUS CONTINUOUS
Status: DISCONTINUED | OUTPATIENT
Start: 2023-07-26 | End: 2023-07-27 | Stop reason: HOSPADM

## 2023-07-26 RX ORDER — INSULIN LISPRO 100 [IU]/ML
1-15 INJECTION, SOLUTION INTRAVENOUS; SUBCUTANEOUS
Status: DISCONTINUED | OUTPATIENT
Start: 2023-07-27 | End: 2023-07-27 | Stop reason: HOSPADM

## 2023-07-26 RX ORDER — FENTANYL/ROPIVACAINE/NS/PF 2-625MCG/1
15 PLASTIC BAG, INJECTION (ML) EPIDURAL
Status: COMPLETED | OUTPATIENT
Start: 2023-07-26 | End: 2023-07-26

## 2023-07-26 RX ORDER — DEXTROSE MONOHYDRATE 25 G/50ML
25 INJECTION, SOLUTION INTRAVENOUS
Status: DISCONTINUED | OUTPATIENT
Start: 2023-07-26 | End: 2023-07-27 | Stop reason: HOSPADM

## 2023-07-26 RX ORDER — IBUPROFEN 600 MG/1
1 TABLET ORAL
Status: DISCONTINUED | OUTPATIENT
Start: 2023-07-26 | End: 2023-07-27 | Stop reason: HOSPADM

## 2023-07-26 RX ORDER — ATORVASTATIN CALCIUM 10 MG/1
10 TABLET, FILM COATED ORAL NIGHTLY
Status: DISCONTINUED | OUTPATIENT
Start: 2023-07-26 | End: 2023-07-27 | Stop reason: HOSPADM

## 2023-07-26 RX ORDER — NICOTINE POLACRILEX 4 MG
15 LOZENGE BUCCAL
Status: DISCONTINUED | OUTPATIENT
Start: 2023-07-26 | End: 2023-07-27 | Stop reason: HOSPADM

## 2023-07-26 RX ORDER — AMOXICILLIN AND CLAVULANATE POTASSIUM 875; 125 MG/1; MG/1
1 TABLET, FILM COATED ORAL EVERY 12 HOURS SCHEDULED
Status: DISCONTINUED | OUTPATIENT
Start: 2023-07-26 | End: 2023-07-27 | Stop reason: HOSPADM

## 2023-07-26 RX ORDER — INSULIN LISPRO 100 [IU]/ML
2-9 INJECTION, SOLUTION INTRAVENOUS; SUBCUTANEOUS
Status: DISCONTINUED | OUTPATIENT
Start: 2023-07-26 | End: 2023-07-27 | Stop reason: HOSPADM

## 2023-07-26 RX ADMIN — SENNOSIDES AND DOCUSATE SODIUM 2 TABLET: 50; 8.6 TABLET ORAL at 21:26

## 2023-07-26 RX ADMIN — ONDANSETRON 4 MG: 2 INJECTION INTRAMUSCULAR; INTRAVENOUS at 12:22

## 2023-07-26 RX ADMIN — INSULIN GLARGINE 30 UNITS: 100 INJECTION, SOLUTION SUBCUTANEOUS at 21:35

## 2023-07-26 RX ADMIN — POTASSIUM PHOSPHATE, MONOBASIC AND POTASSIUM PHOSPHATE, DIBASIC 15 MMOL: 224; 236 INJECTION, SOLUTION, CONCENTRATE INTRAVENOUS at 10:09

## 2023-07-26 RX ADMIN — POTASSIUM PHOSPHATE, MONOBASIC AND POTASSIUM PHOSPHATE, DIBASIC 15 MMOL: 224; 236 INJECTION, SOLUTION, CONCENTRATE INTRAVENOUS at 12:34

## 2023-07-26 RX ADMIN — INSULIN GLARGINE 30 UNITS: 100 INJECTION, SOLUTION SUBCUTANEOUS at 15:03

## 2023-07-26 RX ADMIN — ATORVASTATIN CALCIUM 10 MG: 10 TABLET, FILM COATED ORAL at 21:27

## 2023-07-26 RX ADMIN — INSULIN LISPRO 6 UNITS: 100 INJECTION, SOLUTION INTRAVENOUS; SUBCUTANEOUS at 21:34

## 2023-07-26 RX ADMIN — DEXTROSE MONOHYDRATE, SODIUM CHLORIDE, AND POTASSIUM CHLORIDE 150 ML/HR: 50; 4.5; 1.49 INJECTION, SOLUTION INTRAVENOUS at 10:16

## 2023-07-26 RX ADMIN — SODIUM CHLORIDE 500 ML: 9 INJECTION, SOLUTION INTRAVENOUS at 10:09

## 2023-07-26 RX ADMIN — AMOXICILLIN AND CLAVULANATE POTASSIUM 1 TABLET: 875; 125 TABLET, FILM COATED ORAL at 12:34

## 2023-07-26 RX ADMIN — INSULIN LISPRO 2 UNITS: 100 INJECTION, SOLUTION INTRAVENOUS; SUBCUTANEOUS at 17:51

## 2023-07-26 RX ADMIN — Medication 10 ML: at 21:34

## 2023-07-26 RX ADMIN — INSULIN HUMAN 4.5 UNITS/HR: 1 INJECTION, SOLUTION INTRAVENOUS at 08:24

## 2023-07-26 RX ADMIN — Medication 10 ML: at 21:27

## 2023-07-26 RX ADMIN — Medication 10 ML: at 08:38

## 2023-07-26 RX ADMIN — ACETAMINOPHEN 650 MG: 325 TABLET, FILM COATED ORAL at 12:22

## 2023-07-26 RX ADMIN — AMOXICILLIN AND CLAVULANATE POTASSIUM 1 TABLET: 875; 125 TABLET, FILM COATED ORAL at 21:26

## 2023-07-26 RX ADMIN — IOPAMIDOL 100 ML: 755 INJECTION, SOLUTION INTRAVENOUS at 12:14

## 2023-07-26 RX ADMIN — HEPARIN SODIUM 5000 UNITS: 5000 INJECTION INTRAVENOUS; SUBCUTANEOUS at 21:26

## 2023-07-26 RX ADMIN — FAMOTIDINE 20 MG: 10 INJECTION INTRAVENOUS at 08:24

## 2023-07-26 RX ADMIN — AMOXICILLIN 500 MG: 250 CAPSULE ORAL at 08:24

## 2023-07-26 RX ADMIN — DEXTROSE MONOHYDRATE, SODIUM CHLORIDE, AND POTASSIUM CHLORIDE 150 ML/HR: 50; 4.5; 1.49 INJECTION, SOLUTION INTRAVENOUS at 03:53

## 2023-07-26 RX ADMIN — SODIUM CHLORIDE 100 ML/HR: 9 INJECTION, SOLUTION INTRAVENOUS at 21:02

## 2023-07-26 NOTE — PROGRESS NOTES
Critical Care Progress Note     Lisa Lu : 1980 MRN:[id number] LOS:1  Rm: 2303/1     Principal Problem: DKA (diabetic ketoacidosis)     Reason for follow up: All the medical problems listed below    Summary     Lisa Lu is a 42 y.o. female with PMH of diabetes mellitus type 1 on insulin, fatty liver, hyperlipidemia, and congenital blindness, presented to the hospital for nausea with vomiting and elevated blood glucose, and was admitted with a principal diagnosis of DKA (diabetic ketoacidosis).  Patient reported that she has been out of her insulin for approximately 4 days, and she has attempted to obtain a early refill through her insurance company, but was told she had to go through her primary physician first.  She attempted to schedule an appointment with her primary physician, which was for later this week, but unfortunately began vomiting today.  Due to this, she was concerned that she may be proceeding into DKA and presented to the ED for further treatment and evaluation.  Patient denied abdominal pain, cough, congestion, fever, chills, constipation or diarrhea. Patient's sees Dr. Saldana, Endocrinology.  The intensivist service was consulted for admission to ICU.  Patient was recently seen by a dentist and found to have a dental abscess.  She has been on amoxicillin for this, as she has a planned follow up appointment to have this taken care of.     ED course: Labs were reviewed with the following abnormalities: Sodium 128, chloride 90, CO2 11, anion gap 27, BUN 21, creatinine 1.01, glucose 416, acetone moderate, WBC 13, urine hCG-negative.  Urinalysis was negative for bacteria and WBC 0-2, with glucosuria & ketonuria.  VBG pH 7.169, PCO2 34, PO2 51.5, HCO3 12.4 with venous oxygen saturation of 76.7%.  Patient was found to be in DKA, and the DKA protocol was initiated.  Patient has been started on IV fluids as well as insulin drip.     ACP: No advance care planning documentation on  file, patient is currently her own decision maker.  No additional decision-maker has been identified though it was discussed.    Significant Events     07/26/23 : Labs pending, not yet ready to transition as of most recent labs.  Remains on IVF and insulin gtt.  Patient is stable for downgrade to PCU, consult hospitalist.  Will benefit from endocrinology consultation prior to discharge as she does need her home insulin regimen adjusted.  Likely will transition later today.    Assessment / Plan     Diabetic ketoacidosis in patient with type 1 diabetes mellitus without coma  Diabetes mellitus, type 1 with long-term insulin use, not well controlled  Likely due to needing meds adjusted, was unable to get insurance to pre-approve release of additional insulin, ran out at home.  Lipase 12, Acetone moderate.  Glucose 416 on admission.  VBG with pH 7.169/HCO3 12.4 on admission.  Hemoglobin A1c-9.2 (Previous 9.3, 2/23)  IVF bolus and IVF per DKA protocol.  Insulin gtt initiated.  Electrolyte replacement per protocol.  Anion gap closed, but bicarb remains low at 12.  Not yet ready to transition.     Metabolic acidosis, increased anion gap  Secondary to DKA, resolved.  Continue IVF.  Monitor and trend labs.     Mild hyponatremia  Secondary to acute dehydration, resolved.  Continue IVF.  Monitor and trend labs.     Tooth abscess, recent  Planned dental procedure, on amoxicillin, continue per home regimen.  Planned procedure scheduled in near future.     Congenital blindness: Chronic and stable.  Fatty Liver: Chronic and stable.  Hyperlipidemia: Statin therapy initiated.    Disposition:  Remains on insulin gtt, expect discharge likely on 7/27.    Code status:   Code Status (Patient has no pulse and is not breathing): CPR (Attempt to Resuscitate)  Medical Interventions (Patient has pulse or is breathing): Full Support  Release to patient: Routine Release       Nutrition:   NPO Diet NPO Type: Ice Chips   Patient isn't on Tube  Feeding     DVT prophylaxis:  Medical DVT prophylaxis orders are present.     Subjective / Review of systems     Review of Systems   Constitutional:  Negative for chills and fever.   HENT:  Negative for congestion and sore throat.    Respiratory:  Negative for cough and shortness of breath.    Cardiovascular:  Negative for chest pain and palpitations.   Gastrointestinal:  Negative for abdominal pain, nausea and vomiting.   Endocrine: Negative for cold intolerance, heat intolerance, polydipsia and polyuria.   Genitourinary:  Negative for dysuria and urgency.   Musculoskeletal:  Negative for arthralgias and myalgias.   Skin:  Negative for rash and wound.   Neurological:  Negative for weakness and numbness.   Hematological:  Negative for adenopathy. Does not bruise/bleed easily.   Psychiatric/Behavioral:  Negative for confusion. The patient is not nervous/anxious.       Objective / Physical Exam     Vital signs:  Temp: 98.2 °F (36.8 °C)  BP: 120/73  Heart Rate: 93  Resp: 14  SpO2: 98 %  Weight: 70.9 kg (156 lb 4.9 oz)    Admission Weight: Weight: 69.3 kg (152 lb 12.5 oz)  Current Weight: Weight: 70.9 kg (156 lb 4.9 oz)    Input/Output in last 24 hours:    Intake/Output Summary (Last 24 hours) at 7/26/2023 0819  Last data filed at 7/26/2023 0606  Gross per 24 hour   Intake 2287 ml   Output 1050 ml   Net 1237 ml      Net IO Since Admission: 1,237 mL [07/26/23 0819]     Physical Exam  Vitals and nursing note reviewed.   Constitutional:       General: She is not in acute distress.     Appearance: Normal appearance.   HENT:      Head: Normocephalic and atraumatic.      Nose: Nose normal. No congestion or rhinorrhea.      Mouth/Throat:      Mouth: Mucous membranes are moist.      Pharynx: Oropharynx is clear. No posterior oropharyngeal erythema.   Eyes:      General: No scleral icterus.     Extraocular Movements: Extraocular movements intact.      Conjunctiva/sclera: Conjunctivae normal.      Pupils: Pupils are equal, round,  and reactive to light.   Cardiovascular:      Rate and Rhythm: Normal rate and regular rhythm.      Heart sounds: Normal heart sounds. No murmur heard.    No gallop.   Pulmonary:      Effort: Pulmonary effort is normal. No respiratory distress.      Breath sounds: Normal breath sounds. No wheezing, rhonchi or rales.   Abdominal:      General: There is no distension.      Palpations: Abdomen is soft.      Tenderness: There is no abdominal tenderness.   Musculoskeletal:         General: No tenderness.      Cervical back: Normal range of motion. No rigidity.      Right lower leg: No edema.      Left lower leg: No edema.   Skin:     General: Skin is warm and dry.      Findings: No rash.   Neurological:      General: No focal deficit present.      Mental Status: She is alert and oriented to person, place, and time.   Psychiatric:         Mood and Affect: Mood normal.         Behavior: Behavior normal.         Thought Content: Thought content normal.         Judgment: Judgment normal.        Radiology and Labs     Results from last 7 days   Lab Units 07/26/23  0707 07/25/23  2341 07/25/23  1541   WBC 10*3/mm3 7.70 10.40 13.00*   HEMOGLOBIN g/dL 11.2* 11.5* 12.8   HEMATOCRIT % 34.7 35.9 39.4   PLATELETS 10*3/mm3 247 272 330           Results from last 7 days   Lab Units 07/25/23  2341 07/25/23  1946 07/25/23  1541   SODIUM mmol/L 137 135* 128*   POTASSIUM mmol/L 4.9 5.0 5.0   CHLORIDE mmol/L 110* 104 90*   CO2 mmol/L 12.0* 10.0* 11.0*   BUN mg/dL 13 18 21*   CREATININE mg/dL 0.74 0.82 1.01*   GLUCOSE mg/dL 224* 303* 447*   MAGNESIUM mg/dL 2.0 2.0 2.0   PHOSPHORUS mg/dL 2.3* 3.7 5.1*      Results from last 7 days   Lab Units 07/25/23  2341 07/25/23  1541   ALK PHOS U/L 70 86   AST (SGOT) U/L 12 16   ALT (SGPT) U/L 18 24     Results from last 7 days   Lab Units 07/25/23  1541   FIO2 % 21       Current medications     Scheduled Meds:   amoxicillin, 500 mg, Oral, TID  atorvastatin, 10 mg, Oral, Nightly  famotidine, 20 mg,  Intravenous, Daily  heparin (porcine), 5,000 Units, Subcutaneous, Q8H  senna-docusate sodium, 2 tablet, Oral, BID  sodium chloride, 10 mL, Intravenous, Q12H  sodium chloride, 10 mL, Intravenous, Q12H        Continuous Infusions:   dextrose 5 % and sodium chloride 0.45 %, 150 mL/hr  dextrose 5 % and sodium chloride 0.45 % with KCl 20 mEq/L, 150 mL/hr, Last Rate: 150 mL/hr (07/26/23 0353)  dextrose 5 % and sodium chloride 0.45 % with KCl 40 mEq/L, 150 mL/hr  dextrose 5 % and sodium chloride 0.9 %, 150 mL/hr  dextrose 5 % and sodium chloride 0.9 % with KCl 20 mEq, 150 mL/hr  dextrose 5% and sodium chloride 0.9% with KCl 40 mEq/L, 150 mL/hr  insulin, 0-100 Units/hr, Last Rate: 4.5 Units/hr (07/26/23 0746)  custom IV KCl infusion builder, 250 mL/hr  sodium chloride, 250 mL/hr  sodium chloride 0.45 % with KCl 20 mEq, 250 mL/hr  sodium chloride, 250 mL/hr  sodium chloride 0.9 % with KCl 20 mEq, 250 mL/hr, Last Rate: Stopped (07/25/23 2153)  sodium chloride 0.9 % with KCl 40 mEq/L, 250 mL/hr          Plan discussed with RN. Reviewed all other data in the last 24 hours, including but not limited to vitals, labs, microbiology, imaging and pertinent notes from other providers.  Plan also discussed with patient at the bedside.      ALLEN Valdivia   Critical Care  07/26/23   08:19 EDT

## 2023-07-26 NOTE — CASE MANAGEMENT/SOCIAL WORK
Social Work Assessment   Shad     Patient Name: Lisa Lu  MRN: [id number]  Today's Date: 7/26/2023    Admit Date: 7/25/2023     Discharge Plan       Row Name 07/26/23 1602       Plan    Plan Comments LSW notified later in the day regarding patient's report of trouble affording food and insulin. LSW noted in handoff for  follow-up on 7/27 regarding food concerns. Noted consult for diabetic educator, will follow their note regarding insulin/costs.        ROBERT Sahu, MSSW, [facility]    Phone: [phone]  Cell: [phone]  Fax: [phone]  [email or web address]

## 2023-07-26 NOTE — CONSULTS
"Diabetes Education  Assessment/Teaching    Patient Name:  Lisa Lu  YOB: 1980  MRN: [id number]  Admit Date:  7/25/2023      Assessment Date:  7/26/2023  Flowsheet Row Most Recent Value   General Information     Referral From: A1c, Blood glucose, MD order  [MD consult for DKA, admisison blood sugar 416, and on 7/23/2023 A1c was 9.2%.]   Height 157.5 cm (62\")   Height Method Stated   Weight 70.9 kg (156 lb 4.9 oz)   Weight Method Bed scale   Pregnancy Assessment    Diabetes History    What type of diabetes do you have? Type 1   Length of Diabetes Diagnosis 6 - 10 years  [Diagnosed 2015]   Current DM knowledge fair   Do you test your blood sugar at home? yes   Frequency of checks as often as needs   Meter type Dexcom G6 True Metrix as back up meter   Who performs the test? patient   Have you had high blood sugar? (>140mg/dl) yes   How often do you have high blood sugar? frequently   When was your last high blood sugar? Admission blood sugar 416   Education Preferences    What areas of diabetes would you like to learn about? avoiding high blood sugar, diabetes complications, medications for diabetes   Nutrition Information    Assessment Topics    Taking Medication - Assessment Needs education   Problem Solving - Assessment Needs education   Reducing Risk - Assessment Needs education   DM Goals    Taking Medication - Goal Today   Problem Solving - Goal Today   Reducing Risk - Goal Today            Flowsheet Row Most Recent Value   DM Education Needs    Meter Has own   Meter Type Other (comment)  [Dexcom G6]   Medication Insulin, Pen  [At home patient stated she normally takes Levemir 30 units BID and Novolog 1 unit for every 5 grams of carbs but ran out 4 days ago.]   Problem Solving Hyperglycemia, Sick days, Signs, Symptoms, Treatment   Reducing Risks A1C testing  [On 7/23/2023 A1c was 9.2%.]   Discharge Plan Home   Motivation Engaged, Strong   Teaching Method Discussion, Handouts   Patient " Response Verbalized understanding              Other Comments:  A1c info sheet given with discussion on A1c target and healthy blood sugar range. Patient stated she ran out of insulin and insurance would not cover for more until August 11th. Handout given to patient on ReliOn insulin that patient can get if runs out of insulin. Instructed patient to ask Dr. Saldana what dose he would recommend if she needed to use the ReliOn insulin. Patient stated she wasn't using the sliding scale because she didn't understand it. Explained to patient that if her blood sugar is 30 mg/dL > 140 she would give 1 unit for a blood sugar 170, 2 units for a blood sugar of 200, and wrote out a chart for her to use when she goes home. Patient stated she has ketone strips at home. Handouts given with discussion on DKA, preventing DKA, sick day management, and checking urine for ketones. Patient willing to do meds to beds to make sure she has insulin when discharged.  Prescriptions started in discharge orders for Levemir and Novolog for a 90 day supply which patient stated is what the insurance requires.Patient has no further questions or concerns related to diabetes at this time.    Electronically signed by:  Sola Caldwell RN  07/26/23 16:28 EDT

## 2023-07-26 NOTE — NURSING NOTE
Patient transferred to PCU room 2120. Insulin gtt and fluids remains running. Report called and given. Passed in report for RN to turn insulin gtt off at 1700 today per MD order. Patient alert and oriented x4. Patient NSR with stable vitals.

## 2023-07-26 NOTE — CONSULTS
Inpatient Endocrine Consult  Consultation requested by hospitalist team for uncontrolled type 1 diabetes  Patient Care Team:  Jody Saldana MD as PCP - General (Endocrinology)    Chief Complaint: Uncontrolled type 1 diabetes    HPI: This is a 42-year-old female with history of type 1 diabetes, hyperlipidemia, fatty liver on insulin apparently was having tough time getting her insulin refilled because of her insurance issues.  Patient apparently was off insulin for 4 days and ended up in DKA.  She was treated with DKA protocol and has improved significantly and able to eat and no nausea or vomiting.  Patient has been started on subcu insulins.  Feeling better at this time.    Past Medical History:   Diagnosis Date    Diabetes mellitus type I     Hyperlipidemia        Social History     Socioeconomic History    Marital status:    Tobacco Use    Smoking status: Never    Smokeless tobacco: Never   Vaping Use    Vaping Use: Never used   Substance and Sexual Activity    Alcohol use: Never    Drug use: Never    Sexual activity: Defer       Family History   Problem Relation Age of Onset    Bipolar disorder Mother     Schizophrenia Mother     Ulcers Father     Asthma Sister        Allergies   Allergen Reactions    Codeine Other (See Comments)     Memory loss          ROS:   Constitutional:  Denies fatigue, tiredness.    Eyes:  Denies change in visual acuity   HENT:  Denies nasal congestion or sore throat   Respiratory: Denies cough, shortness of breath.   Cardiovascular:  Denies chest pain, edema   GI:  Denies abdominal pain, nausea, vomiting.   :  Denies polyuria and polydipsia  Musculoskeletal:  Denies back pain or joint pain   Integument:  Denies dry skin, rash   Neurologic:  Denies headache, focal weakness or sensory changes   Endocrine:  Denies polyuria or polydipsia   Psychiatric:  Denies depression or anxiety      Vitals:    07/26/23 1659   BP: 122/90   Pulse: 100   Resp: 14   Temp: 97.9 °F (36.6 °C)    SpO2:       Body mass index is 28.59 kg/m².     Physical Exam:  GEN: NAD, conversant  EYES: EOMI, PERRL, no conjunctival erythema  NECK: no thyromegaly, full ROM   CV: RRR, no murmurs/rubs/gallops, no peripheral edema  LUNG: CTAB, no wheezes/rales/rhonchi  SKIN: no rashes, no acanthosis  MSK: no deformities, full ROM of all extremities  NEURO: no tremors, DTR normal  PSYCH: AOX3, appropriate mood, affect normal      Results Review:     I reviewed the patient's new clinical results.    Lab Results   Component Value Date    GLUCOSE 106 (H) 07/26/2023    BUN 6 07/26/2023    CREATININE 0.54 (L) 07/26/2023    EGFRIFNONA 126 08/27/2020    EGFRIFAFRI >150 08/27/2020    BCR 11.1 07/26/2023    K 4.1 07/26/2023    CO2 15.0 (L) 07/26/2023    CALCIUM 8.3 (L) 07/26/2023    ALBUMIN 3.4 (L) 07/26/2023    LABIL2 0.8 (L) 12/10/2018    AST 10 07/26/2023    ALT 14 07/26/2023       Lab Results   Component Value Date    HGBA1C 9.20 (H) 07/25/2023    HGBA1C 9.3 (H) 02/02/2023    HGBA1C 8.6 (H) 08/27/2020     Lab Results   Component Value Date    MICROALBUR <1.2 02/02/2023    CREATININE 0.54 (L) 07/26/2023     Results from last 7 days   Lab Units 07/26/23  1733 07/26/23  1634 07/26/23  1527 07/26/23  1420 07/26/23  1315 07/26/23  1212   GLUCOSE mg/dL 152* 169* 167* 152* 125* 102       Medication Review: Reviewed.       Current Facility-Administered Medications:     acetaminophen (TYLENOL) tablet 650 mg, 650 mg, Oral, Q4H PRN, Farhad Black, APRN, 650 mg at 07/26/23 1222    aluminum-magnesium hydroxide-simethicone (MAALOX MAX) 400-400-40 MG/5ML suspension 15 mL, 15 mL, Oral, Q6H PRN, Farhad Black APRN    amoxicillin-clavulanate (AUGMENTIN) 875-125 MG per tablet 1 tablet, 1 tablet, Oral, Q12H, Nomi Hill DO, 1 tablet at 07/26/23 1234    atorvastatin (LIPITOR) tablet 10 mg, 10 mg, Oral, Nightly, Farhad Black APRN    sennosides-docusate (PERICOLACE) 8.6-50 MG per tablet 2 tablet, 2 tablet, Oral, BID, 2 tablet at  07/25/23 2034 **AND** polyethylene glycol (MIRALAX) packet 17 g, 17 g, Oral, Daily PRN **AND** bisacodyl (DULCOLAX) EC tablet 5 mg, 5 mg, Oral, Daily PRN **AND** bisacodyl (DULCOLAX) suppository 10 mg, 10 mg, Rectal, Daily PRN, Sofia, Farhad E, APRN    Calcium Replacement - Follow Nurse / BPA Driven Protocol, , Does not apply, PRN, Love, Farhad E, APRN    dextrose (D50W) (25 g/50 mL) IV injection 10-50 mL, 10-50 mL, Intravenous, Q15 Min PRN, Sofia, Farhad E, APRN    dextrose (D50W) (25 g/50 mL) IV injection 25 g, 25 g, Intravenous, Q15 Min PRN, Sofia Farhad E, APRN    dextrose (GLUTOSE) oral gel 15 g, 15 g, Oral, Q15 Min PRN, Sofia Farhad E, APRN    dextrose 5 % and sodium chloride 0.45 % infusion, 150 mL/hr, Intravenous, Continuous PRN, Love, Farhad E, APRN    dextrose 5 % and sodium chloride 0.45 % with KCl 20 mEq/L infusion, 150 mL/hr, Intravenous, Continuous PRN, Love, Farhad E, APRN, Stopped at 07/26/23 1645    dextrose 5 % and sodium chloride 0.45 % with KCl 40 mEq/L infusion, 150 mL/hr, Intravenous, Continuous PRN, Sofia, Farhad E, APRN    dextrose 5 % and sodium chloride 0.9 % infusion, 150 mL/hr, Intravenous, Continuous PRN, Love, Farhad E, APRN    dextrose 5 % and sodium chloride 0.9 % with KCl 20 mEq/L infusion, 150 mL/hr, Intravenous, Continuous PRN, Love, Farhad E, APRN    dextrose 5 % and sodium chloride 0.9 % with KCl 40 mEq/L infusion, 150 mL/hr, Intravenous, Continuous PRN, Love, Farhad E, APRN    famotidine (PEPCID) injection 20 mg, 20 mg, Intravenous, Daily, Farhad Black, APRN, 20 mg at 07/26/23 0824    glucagon (GLUCAGEN) injection 1 mg, 1 mg, Intramuscular, Q15 Min PRN, Farhad Black APRN    heparin (porcine) 5000 UNIT/ML injection 5,000 Units, 5,000 Units, Subcutaneous, Q8H, Farhad Black APRN    insulin glargine (LANTUS, SEMGLEE) injection 30 Units, 30 Units, Subcutaneous, BID, Farhad Black APRN    insulin lispro (HUMALOG/ADMELOG) injection 2-9 Units, 2-9 Units,  Subcutaneous, 4x Daily AC & at Bedtime, Farhad Black APRN, 2 Units at 07/26/23 1751    insulin regular 1 unit/mL in 0.9% sodium chloride (Glucommander), 0-100 Units/hr, Intravenous, Titrated, Farhad Black, APRN, Stopped at 07/26/23 1644    Magnesium Cardiology Dose Replacement - Follow Nurse / BPA Driven Protocol, , Does not apply, PRN, Farhad Black E, APRN    nitroglycerin (NITROSTAT) SL tablet 0.4 mg, 0.4 mg, Sublingual, Q5 Min PRN, Farhad Black, APRN    ondansetron (ZOFRAN) tablet 4 mg, 4 mg, Oral, Q6H PRN **OR** ondansetron (ZOFRAN) injection 4 mg, 4 mg, Intravenous, Q6H PRN, Farhad Black, APRN, 4 mg at 07/26/23 1222    Phosphorus Replacement - Follow Nurse / BPA Driven Protocol, , Does not apply, PRN, Farhad Black E, APRN    Potassium Replacement - Follow Nurse / BPA Driven Protocol, , Does not apply, PRN, Farhad Black E, APRN    sodium chloride 0.45 % 1,000 mL with potassium chloride 40 mEq infusion, 250 mL/hr, Intravenous, Continuous PRN, Farhad Black E, APRN    sodium chloride 0.45 % infusion, 250 mL/hr, Intravenous, Continuous PRN, Farhad Black E, APRN    sodium chloride 0.45 % with KCl 20 mEq/L infusion, 250 mL/hr, Intravenous, Continuous PRN, Farhad Black E, APRN    [COMPLETED] Insert Peripheral IV, , , Once **AND** sodium chloride 0.9 % flush 10 mL, 10 mL, Intravenous, PRN, Jd Blackip E, APRN    sodium chloride 0.9 % flush 10 mL, 10 mL, Intravenous, Q12H, Frahad Black E, APRN, 10 mL at 07/26/23 0838    sodium chloride 0.9 % flush 10 mL, 10 mL, Intravenous, PRN, Jd Blackip E, APRN    sodium chloride 0.9 % flush 10 mL, 10 mL, Intravenous, Q12H, Farhad Black E, APRN, 10 mL at 07/26/23 0838    sodium chloride 0.9 % flush 10 mL, 10 mL, Intravenous, PRN, Sofia, Farhad E, APRN    sodium chloride 0.9 % infusion 40 mL, 40 mL, Intravenous, PRN, Sofia, Farhad E, APRN    sodium chloride 0.9 % infusion 40 mL, 40 mL, Intravenous, PRN, Sofia, Farhad E, APRN    sodium chloride 0.9 % infusion,  250 mL/hr, Intravenous, Continuous PRN, Love, Farhad E, APRN    sodium chloride 0.9 % with KCl 20 mEq/L infusion, 250 mL/hr, Intravenous, Continuous PRN, Love, Farhad E, APRN, Stopped at 07/25/23 2153    sodium chloride 0.9 % with KCl 40 mEq/L infusion, 250 mL/hr, Intravenous, Continuous PRN, Love, Farhad E, APRN          Assessment and plan:  Diabetes mellitus type 1 with hyperglycemia: Uncontrolled but improving, agree with Lantus 30 units subcu twice a day as she takes Levemir 30 units twice a day at home.  Also she takes NovoLog 1 unit for each 5 g of carbohydrate before meals along with NovoLog correction scale.  I will add Humalog 1 unit for each 5 g of carb at before meals and continue Humalog sliding scale.  We will follow blood sugars and make further adjustments as needed.  We will add normal saline at 100 cc an hour and follow labs in the morning.    Hyperlipidemia: Currently on atorvastatin.    Thank you very much for the consultation.      Jody Saldana MD FACE.

## 2023-07-26 NOTE — CASE MANAGEMENT/SOCIAL WORK
Discharge Planning Assessment   Shad     Patient Name: Lisa Lu  MRN: [id number]  Today's Date: 7/26/2023    Admit Date: 7/25/2023    Plan: DC Plan: Anticipate Routine Home with Family. Lives with dependent daughter. MSW to follow.   Discharge Needs Assessment       Row Name 07/26/23 1554       Living Environment    People in Home child(savana), dependent    Name(s) of People in Home Lives with daughter    Current Living Arrangements home    Potentially Unsafe Housing Conditions none    Primary Care Provided by self    Provides Primary Care For child(savana)    Family Caregiver if Needed parent(s)    Family Caregiver Names Father Bigg    Quality of Family Relationships unable to assess    Able to Return to Prior Arrangements yes       Resource/Environmental Concerns    Resource/Environmental Concerns none    Transportation Concerns none       Food Insecurity    Within the past 12 months, you worried that your food would run out before you got the money to buy more. Sometimes       Transition Planning    Patient/Family Anticipates Transition to home with family    Patient/Family Anticipated Services at Transition none    Transportation Anticipated family or friend will provide       Discharge Needs Assessment    Readmission Within the Last 30 Days no previous admission in last 30 days    Equipment Currently Used at Home none    Concerns to be Addressed discharge planning;medication;basic needs    Anticipated Changes Related to Illness none    Equipment Needed After Discharge none    Provided Post Acute Provider List? N/A    Provided Post Acute Provider Quality & Resource List? N/A                   Discharge Plan       Row Name 07/26/23 1556       Plan    Plan DC Plan: Anticipate Routine Home with Family. Lives with dependent daughter. MSW to follow.    Provided Post Acute Provider List? N/A    Provided Post Acute Provider Quality & Resource List? N/A    Plan Comments CM spoke with patient at bedside to discuss  admission assessment and discharge planning. Patient confirms PCP and pharmacy. Patient already enrolled in meds to bed program. Patient states she has difficulty affording her medications like Insulin and sometimes had difficulty getting food. CM informed MSW of concerns. Patient denies any need for services or DME at this time. Patient states her father will be driving her home when ready for DC.CM spoke with patient's nurse, intensivist, and NP to obtain clinical updates. Patient to downgrade to [facility] Monitor level of care.CM will continue to follow for any further needs and adjust discharge plan accordingly. DC Barriers: Endocrinology consult, Insulin gtt transition to sub Q, and phos replacement.               Expected Discharge Date and Time       Expected Discharge Date Expected Discharge Time    Jul 27, 2023            Demographic Summary       Row Name 07/26/23 1553       General Information    Admission Type inpatient    Arrived From emergency department;home    Referral Source admission list    Reason for Consult discharge planning    Preferred Language English       Contact Information    Permission Granted to Share Info With                    Functional Status       Row Name 07/26/23 1553       Functional Status    Usual Activity Tolerance excellent    Current Activity Tolerance excellent       Physical Activity    On average, how many days per week do you engage in moderate to strenuous exercise (like a brisk walk)? 2 days    On average, how many minutes do you engage in exercise at this level? 30 min    Number of minutes of exercise per week 60       Functional Status, IADL    Medications independent    Meal Preparation independent    Housekeeping independent    Laundry independent    Shopping independent       Mental Status    General Appearance WDL WDL       Mental Status Summary    Recent Changes in Mental Status/Cognitive Functioning no changes       Employment/    Employment  Status employed full-time    Current or Previous Occupation other (see comments)  [employer] in [employer]    Current or Previous  Service none               Brea Vidales RN     Office Phone: [phone]  Office Cell:     [phone]

## 2023-07-26 NOTE — PAYOR COMM NOTE
"AUTHORIZATION PENDING:   PLEASE CALL OR FAX DETERMINATION TO CONTACT BELOW. THANK YOU.        Lisa Campos RN MSN  /UR  [facility]  [phone] office  [phone] fax  [email or web address]    [facility]  NPI: [id number]  Tax: [phone]            Lisa Monae (42 y.o. Female)       Date of Birth   1980    Social Security Number       Address   [address] [place] [address]    Home Phone   [phone]    MRN   [id number]       Yarsanism   None    Marital Status                               Admission Date   7/25/23    Admission Type   Emergency    Admitting Provider   Sameer Tanner MD    Attending Provider   Sameer Tanner MD    Department, Room/Bed   [facility] INTENSIVE CARE UNIT, 2303/1       Discharge Date       Discharge Disposition       Discharge Destination                                 Attending Provider: Sameer Tanner MD    Allergies: Codeine    Isolation: None   Infection: None   Code Status: CPR    Ht: 157.5 cm (62\")   Wt: 70.9 kg (156 lb 4.9 oz)    Admission Cmt: None   Principal Problem: DKA (diabetic ketoacidosis) [E11.10]                   Active Insurance as of 7/25/2023       Primary Coverage       Payor Plan Insurance Group Employer/Plan Group    [employer] [facility] [employer] PPO [id number]       Payor Plan Address Payor Plan Phone Number Payor Plan Fax Number Effective Dates    [address] [phone]  1/1/2023 - None Entered    [facility] [address]         Subscriber Name Subscriber Birth Date Member ID       LISA MONAE 1980 [id number]                     Emergency Contacts        (Rel.) Home Phone Work Phone Mobile Phone    POLLO BERGMAN (Father) -- -- [phone]          07/25/23 1700  Inpatient Admission  Once     Completed     Level of Care: Critical Care  Diagnosis: DKA (diabetic ketoacidosis) [434071]  Admitting Physician: " SAMEER ESPINOZA [[id number]]  Attending Physician: SAMEER ESPINOZA [[id number]]  Certification: I Certify That Inpatient Hospital Services Are Medically Necessary For Greater Than 2 Midnights             History & Physical        Farhad BlackALLEN at 23 [phone]       Attestation signed by Sameer Espinoza MD at 23 0612      Attending Addendum     Subjective: Missed her insulin for 4 days.  Was supposed to take Levemir 30 units twice daily along with Humalog sliding scale.  Did have a history of DKA when she was pregnant.    Exam: Alert and awake, appears comfortable, moist oral mucosa.    Assessment / Plan:   Diabetic ketoacidosis:   Anion gap of 27.   Continue insulin drip for now.   Adjust iv fluids based on sugars, sodium, bicarbonate and potassium.   Accuchecks every hour and BMP Q4 for now.   Check an A1C.     I have personally reviewed all labs and other data, reviewed all other pertinent notes, interviewed and examined this patient today.     Dr. Sameer Espinoza MD MPH  Staff Intensivist  23   17:18 EDT                        Critical Care History and Physical     [facility] : 1980 MRN:[id number] LOS:0 ROOM: [facility]     Reason for admission: DKA (diabetic ketoacidosis)     Assessment / Plan     Diabetic ketoacidosis in patient with type 1 diabetes mellitus without coma  Diabetes mellitus, type 1 with long-term insulin use, not well controlled  Likely due to needing meds adjusted, was unable to get insurance to pre-approve release of additional insulin, ran out at home.  Lipase 12, Acetone moderate.  Glucose 416 on admission.  VBG with pH 7.169/HCO3 12.4 on admission.  Hemoglobin [id number].  Previous A1c 9.3 (23)  IVF bolus and IVF per DKA protocol.  Insulin gtt initiated.  Electrolyte replacement per protocol.    Metabolic acidosis, increased anion gap  Secondary to DKA.  Continue IVF.  Monitor and trend labs.    Mild hyponatremia  Secondary to acute  dehydration.  Continue IVF.  Monitor and trend labs.    Tooth abscess, recent  Planned dental procedure, on amoxicillin, continue per home regimen.  Planned procedure scheduled in near future.    Congenital blindness: Chronic and stable.  Fatty Liver: Chronic and stable.  Hyperlipidemia: Check lipid in AM, not on statin therapy.    Code Status (Patient has no pulse and is not breathing): CPR (Attempt to Resuscitate)  Medical Interventions (Patient has pulse or is breathing): Full Support  Release to patient: Routine Release       Nutrition:   NPO Diet NPO Type: Strict NPO     DVT prophylaxis:  Medical DVT prophylaxis orders are present.     History of Present illness     Lisa Lu is a 42 y.o. female with PMH of diabetes mellitus type 1 on insulin, fatty liver, hyperlipidemia, and congenital blindness, presented to the hospital for nausea with vomiting and elevated blood glucose, and was admitted with a principal diagnosis of DKA (diabetic ketoacidosis).  Patient reported that she has been out of her insulin for approximately 4 days, and she has attempted to obtain a early refill through her insurance company, but was told she had to go through her primary physician first.  She attempted to schedule an appointment with her primary physician, which was for later this week, but unfortunately began vomiting today.  Due to this, she was concerned that she may be proceeding into DKA and presented to the ED for further treatment and evaluation.  Patient denied abdominal pain, cough, congestion, fever, chills, constipation or diarrhea. Patient's sees Dr. Saldana, Endocrinology.  The intensivist service was consulted for admission to ICU.  Patient was recently seen by a dentist and found to have a dental abscess.  She has been on amoxicillin for this, as she has a planned follow up appointment to have this taken care of.    ED course: Labs were reviewed with the following abnormalities: Sodium 128, chloride 90, CO2  11, anion gap 27, BUN 21, creatinine 1.01, glucose 416, acetone moderate, WBC 13, urine hCG-negative.  Urinalysis was negative for bacteria and WBC 0-2, with glucosuria & ketonuria.  VBG pH 7.169, PCO2 34, PO2 51.5, HCO3 12.4 with venous oxygen saturation of 76.7%.  Patient was found to be in DKA, and the DKA protocol was initiated.  Patient has been started on IV fluids as well as insulin drip.    ACP: No advance care planning documentation on file, patient is currently her own decision maker.  No additional decision-maker has been identified though it was discussed.    Patient was seen and examined on 23 at 17:33 EDT .    Subjective / Review of systems     Review of Systems   Constitutional:  Negative for chills and fever.   HENT:  Negative for congestion and sore throat.         Recently diagnosed dental abscess.   Respiratory:  Negative for cough and shortness of breath.    Cardiovascular:  Negative for chest pain and palpitations.   Gastrointestinal:  Positive for nausea and vomiting. Negative for abdominal distention, abdominal pain and constipation.   Endocrine: Negative for cold intolerance and heat intolerance.   Genitourinary:  Negative for dysuria and flank pain.   Neurological:  Negative for dizziness and headaches.   Hematological:  Negative for adenopathy. Does not bruise/bleed easily.      Past Medical/Surgical/Social/Family History & Allergies     Past Medical History:   Diagnosis Date    Diabetes mellitus type I     Hyperlipidemia       Past Surgical History:   Procedure Laterality Date     SECTION        Social History     Socioeconomic History    Marital status:    Tobacco Use    Smoking status: Never    Smokeless tobacco: Never   Vaping Use    Vaping Use: Never used   Substance and Sexual Activity    Alcohol use: Never    Drug use: Defer    Sexual activity: Defer      Family History   Problem Relation Age of Onset    Bipolar disorder Mother     Schizophrenia Mother     Ulcers  Father     Asthma Sister       Allergies   Allergen Reactions    Codeine Other (See Comments)     Memory loss         Social Determinants of Health     Tobacco Use: Low Risk     Smoking Tobacco Use: Never    Smokeless Tobacco Use: Never    Passive Exposure: Not on file   Alcohol Use: Not on file   Financial Resource Strain: Not on file   Food Insecurity: Not on file   Transportation Needs: Not on file   Physical Activity: Not on file   Stress: Not on file   Social Connections: Not on file   Intimate Partner Violence: Not on file   Depression: Not on file   Housing Stability: Not on file        Home Medications     Prior to Admission medications    Medication Sig Start Date End Date Taking? Authorizing Provider   amoxicillin (AMOXIL) 500 MG capsule Take 1 capsule by mouth 3 (Three) Times a Day.  7/27/23 Yes Rob Epstein MD   glucose (DEX4) 4 GM chewable tablet Chew 4 tablets As Needed for Low Blood Sugar. 3/19/20  Yes Jody Saldana MD   insulin aspart (NovoLOG FlexPen) 100 UNIT/ML solution pen-injector sc pen INJECT 1 UNIT OF EACH 5 G OF CARBOHYDRATE BEFORE EACH MEAL and sliding scale 1 unit for each unit of blood sugar over 140 at meals only. *MAX DOSE 100 UNITS 7/24/23  Yes Jody Saldana MD   insulin detemir (Levemir FlexPen) 100 UNIT/ML injection Inject 30 Units under the skin into the appropriate area as directed 2 (Two) Times a Day. 7/17/23  Yes Jody Saldana MD   loratadine (CLARITIN) 10 MG tablet Take 1 tablet by mouth Daily As Needed for Allergies.   Yes Rob Epstein MD   ACCU-CHEK FASTCLIX LANCETS misc ACCU-CHEK FASTCLIX LANCETS 5/24/18   Rob Epstein MD   acetone, urine, test strip Use to test for ketones as needed 12/24/20   Jody Saldana MD   Blood Glucose Monitoring Suppl (Accu-Chek Magali Plus) w/Device kit Use as instructed to monitor blood sugar levels 7/6/21   Jody Saldana MD   Continuous Blood Gluc  (Dexcom G6 ) device 1 Device Daily. 2/9/23    Jody Saldana MD   Continuous Blood Gluc Sensor (Dexcom G6 Sensor) Every 10 (Ten) Days. 2/9/23   Jody Saldana MD   Continuous Blood Gluc Transmit (Dexcom G6 Transmitter) misc 1 Device Every 3 (Three) Months. Change transmitter every 90 days. DX/E10.65 6/1/23   Jody Saldana MD   [employer] Glucose 4-6 GM-MG chewable tablet chewable tablet CHEW 4 TABLETS AS NEEDED FOR LOW BLOOD SUGAR. 4/12/21   Jody Saldana MD   glucose blood (Accu-Chek Magali Plus) test strip CHECK BLOOD SUGARS 4 TIMES A DAY 4/12/21   Jody Saldana MD   Insulin Pen Needle (B-D UF III MINI PEN NEEDLES) 31G X 5 MM misc Use to inject insulin. DX/E10.65 7/24/23   Jody Saldana MD        Objective / Physical Exam     Vital signs:  Temp: 98.1 °F (36.7 °C)  BP: 107/55  Heart Rate: (!) 122  Resp: 18  SpO2: 100 %  Weight: 69.3 kg (152 lb 12.5 oz)    Admission Weight: Weight: 69.3 kg (152 lb 12.5 oz)    Physical Exam  Vitals and nursing note reviewed.   Constitutional:       General: She is not in acute distress.     Appearance: Normal appearance. She is not ill-appearing, toxic-appearing or diaphoretic.   HENT:      Head: Normocephalic and atraumatic.      Nose: Nose normal. No congestion or rhinorrhea.      Mouth/Throat:      Mouth: Mucous membranes are moist.      Pharynx: Oropharynx is clear. No oropharyngeal exudate or posterior oropharyngeal erythema.   Eyes:      General: No scleral icterus.     Extraocular Movements: Extraocular movements intact.      Conjunctiva/sclera: Conjunctivae normal.      Pupils: Pupils are equal, round, and reactive to light.   Cardiovascular:      Rate and Rhythm: Regular rhythm. Tachycardia present.      Heart sounds: Normal heart sounds. No murmur heard.    No gallop.   Pulmonary:      Effort: Pulmonary effort is normal. No respiratory distress.      Breath sounds: Normal breath sounds. No wheezing, rhonchi or rales.   Abdominal:      General: Bowel sounds are normal. There is no distension.      Palpations: Abdomen  is soft.      Tenderness: There is no abdominal tenderness.   Musculoskeletal:         General: No tenderness. Normal range of motion.      Cervical back: Normal range of motion and neck supple. No rigidity.      Right lower leg: No edema.      Left lower leg: No edema.   Skin:     General: Skin is warm and dry.      Findings: No rash.   Neurological:      General: No focal deficit present.      Mental Status: She is alert and oriented to person, place, and time. Mental status is at baseline.   Psychiatric:         Mood and Affect: Mood normal.         Behavior: Behavior normal.         Thought Content: Thought content normal.         Judgment: Judgment normal.        Labs     Results from last 7 days   Lab Units 07/25/23  1541   WBC 10*3/mm3 13.00*   HEMATOCRIT % 39.4   PLATELETS 10*3/mm3 330      Results from last 7 days   Lab Units 07/25/23  1541   SODIUM mmol/L 128*   POTASSIUM mmol/L 5.0   CHLORIDE mmol/L 90*   CO2 mmol/L 11.0*   BUN mg/dL 21*   CREATININE mg/dL 1.01*        Imaging     Chest X ray: My independent assessment showed no infiltrates or effusions    EKG: My independent evaluation showed sinus tachycardia without ST or T wave abnormalities, heart rate 121, QTc 461.    Current Medications     Scheduled Meds:  famotidine, 20 mg, Intravenous, Daily  heparin (porcine), 5,000 Units, Subcutaneous, Q8H  senna-docusate sodium, 2 tablet, Oral, BID  sodium chloride, 10 mL, Intravenous, Q12H  sodium chloride, 10 mL, Intravenous, Q12H         Continuous Infusions:  dextrose 5 % and sodium chloride 0.45 %, 150 mL/hr  dextrose 5 % and sodium chloride 0.45 % with KCl 20 mEq/L, 150 mL/hr  dextrose 5 % and sodium chloride 0.45 % with KCl 40 mEq/L, 150 mL/hr  dextrose 5 % and sodium chloride 0.9 %, 150 mL/hr  dextrose 5 % and sodium chloride 0.9 % with KCl 20 mEq, 150 mL/hr  dextrose 5% and sodium chloride 0.9% with KCl 40 mEq/L, 150 mL/hr  insulin, 0-100 Units/hr, Last Rate: 3.4 Units/hr (07/25/23 1707)  custom IV  KCl infusion builder, 250 mL/hr  sodium chloride, 250 mL/hr  sodium chloride 0.45 % with KCl 20 mEq, 250 mL/hr  sodium chloride, 1,000 mL/hr  sodium chloride, 250 mL/hr  sodium chloride 0.9 % with KCl 20 mEq, 250 mL/hr  sodium chloride 0.9 % with KCl 40 mEq/L, 250 mL/hr       Plan discussed with RN. Reviewed all other data in the last 24 hours, including but not limited to vitals, labs, microbiology, imaging and pertinent notes from other providers.  Plan also discussed with patient at the bedside.      ALLEN Valdivia   Critical Care  07/25/23   17:33 EDT       Electronically signed by Sameer Tanner MD at 07/26/23 0612          Emergency Department Notes        Xenia Iniguez RN at 07/25/23 1721          Nursing report ED to floor  [facility]  42 y.o.  female    HPI:   Chief Complaint   Patient presents with    Hyperglycemia     Pt reports vomiting and states has been out of her insulin for the past 4 days.  Pt reports vomiting that started on Saturday and again today.  Pt worried she may be in DKA.        Admitting doctor:   Sameer Tanner MD    Admitting diagnosis:   The encounter diagnosis was Diabetic ketoacidosis without coma associated with type 1 diabetes mellitus.    Code status:   Current Code Status       Date Active Code Status Order ID Comments User Context       7/25/2023 1706 CPR (Attempt to Resuscitate) [id number]  Farhad Black APRN ED        Question Answer    Code Status (Patient has no pulse and is not breathing) CPR (Attempt to Resuscitate)    Medical Interventions (Patient has pulse or is breathing) Full Support    Release to patient Routine Release                    Allergies:   Codeine    Isolation:  No active isolations     Fall Risk:  Fall Risk Assessment was completed, and patient is at moderate risk for falls.   Predictive Model Details   No score data available for Risk of Fall        Weight:       07/25/23  1508   Weight: 69.3 kg (152 lb 12.5 oz)       Intake and  "Output  No intake or output data in the 24 hours ending 07/25/23 1721    Diet:   Dietary Orders (From admission, onward)       Start     Ordered    07/25/23 1629  NPO Diet NPO Type: Strict NPO  Diet Effective Now        Question:  NPO Type  Answer:  Strict NPO    07/25/23 1629                     Most recent vitals:   Vitals:    07/25/23 1508 07/25/23 1720   BP: 97/58 107/55   BP Location: Left arm    Patient Position: Sitting Lying   Pulse: (!) 127 (!) 122   Resp: 18    Temp: 98.1 °F (36.7 °C)    TempSrc: Oral    SpO2: 99% 100%   Weight: 69.3 kg (152 lb 12.5 oz)    Height: 157.5 cm (62\")        Active LDAs/IV Access:   Lines, Drains & Airways       Active LDAs       Name Placement date Placement time Site Days    Peripheral IV 07/25/23 1546 Right Antecubital 07/25/23  1546  Antecubital  less than 1                    Skin Condition:   Skin Assessments (last day)       Date/Time Skin WDL    07/25/23 16:29:08 WDL             Labs (abnormal labs have a star):   Labs Reviewed   COMPREHENSIVE METABOLIC PANEL - Abnormal; Notable for the following components:       Result Value    Glucose 447 (*)     BUN 21 (*)     Creatinine 1.01 (*)     Sodium 128 (*)     Chloride 90 (*)     CO2 11.0 (*)     Total Protein 8.6 (*)     Anion Gap 27.0 (*)     All other components within normal limits    Narrative:     GFR Normal >60  Chronic Kidney Disease <60  Kidney Failure <15     URINALYSIS W/ MICROSCOPIC IF INDICATED (NO CULTURE) - Abnormal; Notable for the following components:    Glucose, UA >=1000 mg/dL (3+) (*)     Ketones, UA >=160 mg/dL (4+) (*)     Protein, UA 30 mg/dL (1+) (*)     All other components within normal limits   CBC WITH AUTO DIFFERENTIAL - Abnormal; Notable for the following components:    WBC 13.00 (*)     Neutrophil % 88.2 (*)     Lymphocyte % 7.1 (*)     Monocyte % 3.2 (*)     Eosinophil % 0.1 (*)     Neutrophils, Absolute 11.50 (*)     All other components within normal limits   ACETONE - Abnormal; Notable for " the following components:    Acetone Moderate (*)     All other components within normal limits   BLOOD GAS, VENOUS - Abnormal; Notable for the following components:    pH, Venous 7.169 (*)     pCO2, Venous 34.0 (*)     pO2, Venous 51.5 (*)     HCO3, Venous 12.4 (*)     Base Excess, Venous -15.1 (*)     O2 Saturation, Venous 76.7 (*)     CO2 Content 13.4 (*)     All other components within normal limits   URINALYSIS, MICROSCOPIC ONLY - Abnormal; Notable for the following components:    RBC, UA 0-2 (*)     WBC, UA 0-2 (*)     All other components within normal limits   POCT GLUCOSE FINGERSTICK - Abnormal; Notable for the following components:    Glucose 416 (*)     All other components within normal limits   POCT GLUCOSE FINGERSTICK - Abnormal; Notable for the following components:    Glucose 397 (*)     All other components within normal limits   LIPASE - Normal   PREGNANCY, URINE - Normal   MRSA SCREEN, PCR   BLOOD GAS, VENOUS   PHOSPHORUS   MAGNESIUM   OSMOLALITY, SERUM   HEMOGLOBIN A1C   TROPONIN   BASIC METABOLIC PANEL   BASIC METABOLIC PANEL   MAGNESIUM   MAGNESIUM   PHOSPHORUS   PHOSPHORUS   CBC AND DIFFERENTIAL    Narrative:     The following orders were created for panel order CBC & Differential.  Procedure                               Abnormality         Status                     ---------                               -----------         ------                     CBC Auto Differential[[id number]]        Abnormal            Final result                 Please view results for these tests on the individual orders.       LOC: Person, Place, Time, and Situation    Telemetry:  Critical Care    Cardiac Monitoring Ordered: yes    EKG:   ECG 12 Lead Electrolyte Imbalance   Preliminary Result   HEART RATE= 121  bpm   RR Interval= 496  ms   IA Interval= 142  ms   P Horizontal Axis= -33  deg   P Front Axis= 59  deg   QRSD Interval= 87  ms   QT Interval= 325  ms   QRS Axis= 64  deg   T Wave Axis= -28  deg   -  BORDERLINE ECG -   Sinus tachycardia   Probable left atrial enlargement   No previous ECG available for comparison   Electronically Signed By:    Date and Time of Study: 2023-07-25 16:06:20          Medications Given in the ED:   Medications   sodium chloride 0.9 % flush 10 mL (has no administration in time range)   sodium chloride 0.9 % flush 10 mL (has no administration in time range)   sodium chloride 0.9 % flush 10 mL (has no administration in time range)   sodium chloride 0.9 % infusion 40 mL (has no administration in time range)   dextrose (GLUTOSE) oral gel 15 g (has no administration in time range)   dextrose (D50W) (25 g/50 mL) IV injection 10-50 mL (has no administration in time range)   Glucagon (GLUCAGEN) injection 1 mg (has no administration in time range)   sodium chloride 0.9 % bolus (has no administration in time range)   sodium chloride 0.9 % infusion (has no administration in time range)   sodium chloride 0.9 % with KCl 20 mEq/L infusion (has no administration in time range)   sodium chloride 0.9 % with KCl 40 mEq/L infusion (has no administration in time range)   dextrose 5 % and sodium chloride 0.9 % infusion (has no administration in time range)   dextrose 5 % and sodium chloride 0.9 % with KCl 20 mEq/L infusion (has no administration in time range)   dextrose 5 % and sodium chloride 0.9 % with KCl 40 mEq/L infusion (has no administration in time range)   sodium chloride 0.45 % infusion (has no administration in time range)   sodium chloride 0.45 % with KCl 20 mEq/L infusion (has no administration in time range)   sodium chloride 0.45 % 1,000 mL with potassium chloride 40 mEq infusion (has no administration in time range)   dextrose 5 % and sodium chloride 0.45 % infusion (has no administration in time range)   dextrose 5 % and sodium chloride 0.45 % with KCl 20 mEq/L infusion (has no administration in time range)   dextrose 5 % and sodium chloride 0.45 % with KCl 40 mEq/L infusion (has no  administration in time range)   insulin regular 1 unit/mL in 0.9% sodium chloride (Glucommander) (3.4 Units/hr Intravenous New Bag 7/25/23 1707)   nitroglycerin (NITROSTAT) SL tablet 0.4 mg (has no administration in time range)   sodium chloride 0.9 % flush 10 mL (has no administration in time range)   sodium chloride 0.9 % flush 10 mL (has no administration in time range)   sodium chloride 0.9 % infusion 40 mL (has no administration in time range)   aluminum-magnesium hydroxide-simethicone (MAALOX MAX) 400-400-40 MG/5ML suspension 15 mL (has no administration in time range)   sennosides-docusate (PERICOLACE) 8.6-50 MG per tablet 2 tablet (has no administration in time range)     And   polyethylene glycol (MIRALAX) packet 17 g (has no administration in time range)     And   bisacodyl (DULCOLAX) EC tablet 5 mg (has no administration in time range)     And   bisacodyl (DULCOLAX) suppository 10 mg (has no administration in time range)   acetaminophen (TYLENOL) tablet 650 mg (has no administration in time range)   ondansetron (ZOFRAN) tablet 4 mg (has no administration in time range)     Or   ondansetron (ZOFRAN) injection 4 mg (has no administration in time range)   heparin (porcine) 5000 UNIT/ML injection 5,000 Units (has no administration in time range)   Potassium Replacement - Follow Nurse / BPA Driven Protocol (has no administration in time range)   Magnesium Cardiology Dose Replacement - Follow Nurse / BPA Driven Protocol (has no administration in time range)   Phosphorus Replacement - Follow Nurse / BPA Driven Protocol (has no administration in time range)   Calcium Replacement - Follow Nurse / BPA Driven Protocol (has no administration in time range)   sodium chloride 0.9 % bolus 1,000 mL (0 mL Intravenous Stopped 7/25/23 1720)   ondansetron (ZOFRAN) injection 4 mg (4 mg Intravenous Given 7/25/23 1556)   pantoprazole (PROTONIX) injection 40 mg (40 mg Intravenous Given 7/25/23 1556)   sodium chloride 0.9 % bolus  1,000 mL (0 mL Intravenous Stopped 7/25/23 1720)       Imaging results:  No radiology results for the last day    Social issues:   Social History     Socioeconomic History    Marital status:    Tobacco Use    Smoking status: Never    Smokeless tobacco: Never   Vaping Use    Vaping Use: Never used   Substance and Sexual Activity    Alcohol use: Never    Drug use: Defer    Sexual activity: Defer       NIH Stroke Scale:  Interval: (not recorded)  1a. Level of Consciousness: (not recorded)  1b. LOC Questions: (not recorded)  1c. LOC Commands: (not recorded)  2. Best Gaze: (not recorded)  3. Visual: (not recorded)  4. Facial Palsy: (not recorded)  5a. Motor Arm, Left: (not recorded)  5b. Motor Arm, Right: (not recorded)  6a. Motor Leg, Left: (not recorded)  6b. Motor Leg, Right: (not recorded)  7. Limb Ataxia: (not recorded)  8. Sensory: (not recorded)  9. Best Language: (not recorded)  10. Dysarthria: (not recorded)  11. Extinction and Inattention (formerly Neglect): (not recorded)    Total (NIH Stroke Scale): (not recorded)     Additional notable assessment information:    Nursing report ED to floor:  Cali Iniguez RN   07/25/23 17:21 EDT     Electronically signed by Xenia Iniguez RN at 07/25/23 1722       Guera Kelly at 07/25/23 1557          EKG requested       Electronically signed by Guera Kelly at 07/25/23 1557       Solomon Sloan DO at 07/25/23 1520          Subjective   History of Present Illness  Chief complaint: Patient is a type I diabetic who presents with vomiting and high blood glucose.  She has been out of her blood glucose for the last 4 days.  She tried to get a refill through her insurance company but has to go through her primary physician first.  She has an appointment this week with primary.  She started vomiting today and presented here to the emergency department.  No fever.  She is concerned about being in DKA and so presented here.  No abdominal pain or  diarrhea.  No cough or cold symptoms.  She states that her blood glucose has been running to the point where she had to increase her insulin more over the last few months and then she is running out more quickly.    Context:    Duration: 4 days    Timing:    Severity: Severe    Associated Symptoms:        PCP:  LMP:    Review of Systems   Constitutional:  Negative for fever.   HENT: Negative.     Eyes:  Negative for photophobia.   Respiratory: Negative.     Cardiovascular: Negative.    Gastrointestinal:  Positive for nausea and vomiting. Negative for abdominal pain.   Genitourinary:  Positive for frequency.   Musculoskeletal: Negative.    Neurological: Negative.      Past Medical History:   Diagnosis Date    Diabetes mellitus type I     Hyperlipidemia        Allergies   Allergen Reactions    Codeine Other (See Comments)     Memory loss          Past Surgical History:   Procedure Laterality Date     SECTION         Family History   Problem Relation Age of Onset    Bipolar disorder Mother     Schizophrenia Mother     Ulcers Father     Asthma Sister        Social History     Socioeconomic History    Marital status:    Tobacco Use    Smoking status: Never    Smokeless tobacco: Never   Vaping Use    Vaping Use: Never used   Substance and Sexual Activity    Alcohol use: Never    Drug use: Defer    Sexual activity: Defer           Objective   Physical Exam  Vitals and nursing note reviewed.   Constitutional:       General: She is not in acute distress.  HENT:      Head: Normocephalic and atraumatic.   Eyes:      Extraocular Movements: Extraocular movements intact.      Pupils: Pupils are equal, round, and reactive to light.   Cardiovascular:      Rate and Rhythm: Tachycardia present. Rhythm irregular.   Pulmonary:      Effort: Pulmonary effort is normal.      Breath sounds: Normal breath sounds.   Abdominal:      Palpations: Abdomen is soft.      Tenderness: There is no abdominal tenderness.    Musculoskeletal:         General: No swelling.      Cervical back: Normal range of motion.   Skin:     General: Skin is warm and dry.   Neurological:      General: No focal deficit present.      Mental Status: She is alert and oriented to person, place, and time.   Psychiatric:         Mood and Affect: Mood normal.         Thought Content: Thought content normal.       Procedures          ED Course      Results for orders placed or performed during the hospital encounter of 07/25/23   Comprehensive Metabolic Panel    Specimen: Blood   Result Value Ref Range    Glucose 447 (C) 65 - 99 mg/dL    BUN 21 (H) 6 - 20 mg/dL    Creatinine 1.01 (H) 0.57 - 1.00 mg/dL    Sodium 128 (L) 136 - 145 mmol/L    Potassium 5.0 3.5 - 5.2 mmol/L    Chloride 90 (L) 98 - 107 mmol/L    CO2 11.0 (L) 22.0 - 29.0 mmol/L    Calcium 9.5 8.6 - 10.5 mg/dL    Total Protein 8.6 (H) 6.0 - 8.5 g/dL    Albumin 4.6 3.5 - 5.2 g/dL    ALT (SGPT) 24 1 - 33 U/L    AST (SGOT) 16 1 - 32 U/L    Alkaline Phosphatase 86 39 - 117 U/L    Total Bilirubin 0.8 0.0 - 1.2 mg/dL    Globulin 4.0 gm/dL    A/G Ratio 1.2 g/dL    BUN/Creatinine Ratio 20.8 7.0 - 25.0    Anion Gap 27.0 (H) 5.0 - 15.0 mmol/L    eGFR 71.4 >60.0 mL/min/1.73   Lipase    Specimen: Blood   Result Value Ref Range    Lipase 15 13 - 60 U/L   Urinalysis With Microscopic If Indicated (No Culture) - Urine, Clean Catch    Specimen: Urine, Clean Catch   Result Value Ref Range    Color, UA Yellow Yellow, Straw    Appearance, UA Clear Clear    pH, UA 5.5 5.0 - 8.0    Specific Gravity, UA 1.023 1.005 - 1.030    Glucose, UA >=1000 mg/dL (3+) (A) Negative    Ketones, UA >=160 mg/dL (4+) (A) Negative    Bilirubin, UA Negative Negative    Blood, UA Negative Negative    Protein, UA 30 mg/dL (1+) (A) Negative    Leuk Esterase, UA Negative Negative    Nitrite, UA Negative Negative    Urobilinogen, UA 1.0 E.U./dL 0.2 - 1.0 E.U./dL   CBC Auto Differential    Specimen: Blood   Result Value Ref Range    WBC 13.00 (H)  3.40 - 10.80 10*3/mm3    RBC 4.49 3.77 - 5.28 10*6/mm3    Hemoglobin 12.8 12.0 - 15.9 g/dL    Hematocrit 39.4 34.0 - 46.6 %    MCV 87.6 79.0 - 97.0 fL    MCH 28.6 26.6 - 33.0 pg    MCHC 32.6 31.5 - 35.7 g/dL    RDW 14.0 12.3 - 15.4 %    RDW-SD 43.3 37.0 - 54.0 fl    MPV 9.6 6.0 - 12.0 fL    Platelets 330 140 - 450 10*3/mm3    Neutrophil % 88.2 (H) 42.7 - 76.0 %    Lymphocyte % 7.1 (L) 19.6 - 45.3 %    Monocyte % 3.2 (L) 5.0 - 12.0 %    Eosinophil % 0.1 (L) 0.3 - 6.2 %    Basophil % 1.4 0.0 - 1.5 %    Neutrophils, Absolute 11.50 (H) 1.70 - 7.00 10*3/mm3    Lymphocytes, Absolute 0.90 0.70 - 3.10 10*3/mm3    Monocytes, Absolute 0.40 0.10 - 0.90 10*3/mm3    Eosinophils, Absolute 0.00 0.00 - 0.40 10*3/mm3    Basophils, Absolute 0.20 0.00 - 0.20 10*3/mm3    nRBC 0.0 0.0 - 0.2 /100 WBC   Pregnancy, Urine - Urine, Clean Catch    Specimen: Urine, Clean Catch   Result Value Ref Range    HCG, Urine QL Negative Negative   Acetone    Specimen: Blood   Result Value Ref Range    Acetone Moderate (A) Negative   Blood Gas, Venous -    Specimen: Venous Blood   Result Value Ref Range    Site CentralLine     pH, Venous 7.169 (C) 7.320 - 7.430 pH Units    pCO2, Venous 34.0 (L) 42.0 - 51.0 mm Hg    pO2, Venous 51.5 (H) 40.0 - 42.0 mm Hg    HCO3, Venous 12.4 (L) 22.0 - 26.0 mmol/L    Base Excess, Venous -15.1 (L) -2.0 - 2.0 mmol/L    O2 Saturation, Venous 76.7 (H) 45.0 - 75.0 %    CO2 Content 13.4 (L) 22 - 29 mmol/L    Barometric Pressure for Blood Gas      Modality Room Air     FIO2 21 %   Urinalysis, Microscopic Only - Urine, Clean Catch    Specimen: Urine, Clean Catch   Result Value Ref Range    RBC, UA 0-2 (A) None Seen /HPF    WBC, UA 0-2 (A) None Seen /HPF    Bacteria, UA None Seen None Seen /HPF    Squamous Epithelial Cells, UA 0-2 None Seen, 0-2 /HPF    Hyaline Casts, UA 13-20 None Seen /LPF    Methodology Manual Light Microscopy    POC Glucose Once    Specimen: Blood   Result Value Ref Range    Glucose 416 (C) 70 - 105 mg/dL    ECG 12 Lead Electrolyte Imbalance   Result Value Ref Range    QT Interval 325 ms          No radiology results for the last day                                  Medical Decision Making  Patient was seen evaluated for above problem  Critical care time 35 minutes    Differential diagnose includes but not limited to hyperglycemia, Dehydration, DKA    Patient does have a pH of 7.16.  She has an anion gap of 27.  Potassium is 5.  EKG interpreted by myself sinus tachycardia with a left atrial lodgment rate of 121.  She is persistently tachycardic here in the emergency department.  Acetones positive.  She is definitely in DKA at this point.  Appears as though she is trying to increase her insulin use and has been running out too early.  Insurance unwilling to fill it.  At this point time she is now in DKA.  Discussed with Drew in the intensive care unit and I believe that the cause is likely she is running out of her insulin too soon before her refills are ready.  He accepts patient to the intensive care unit.    Problems Addressed:  Diabetic ketoacidosis without coma associated with type 1 diabetes mellitus: complicated acute illness or injury    Amount and/or Complexity of Data Reviewed  Labs: ordered. Decision-making details documented in ED Course.     Details: Reviewed by myself  Radiology: ordered.  ECG/medicine tests: ordered and independent interpretation performed.     Details: Interpreted by myself as above    Risk  OTC drugs.  Prescription drug management.  Drug therapy requiring intensive monitoring for toxicity.  Decision regarding hospitalization.    Critical Care  Total time providing critical care: 35 minutes      Final diagnoses:   None   DKA    ED Disposition  ED Disposition       None            No follow-up provider specified.       Medication List      No changes were made to your prescriptions during this visit.            Solomon Sloan DO  07/25/23 7862      Electronically signed by  Luther, Solomon Anthony, DO at 07/25/23 7937

## 2023-07-27 VITALS
TEMPERATURE: 98.1 F | SYSTOLIC BLOOD PRESSURE: 128 MMHG | WEIGHT: 160.05 LBS | OXYGEN SATURATION: 97 % | HEIGHT: 62 IN | DIASTOLIC BLOOD PRESSURE: 90 MMHG | HEART RATE: 91 BPM | RESPIRATION RATE: 16 BRPM | BODY MASS INDEX: 29.45 KG/M2

## 2023-07-27 LAB
ALBUMIN SERPL-MCNC: 3.8 G/DL (ref 3.5–5.2)
ALBUMIN/GLOB SERPL: 1.2 G/DL
ALP SERPL-CCNC: 63 U/L (ref 39–117)
ALT SERPL W P-5'-P-CCNC: 15 U/L (ref 1–33)
ANION GAP SERPL CALCULATED.3IONS-SCNC: 13 MMOL/L (ref 5–15)
AST SERPL-CCNC: 10 U/L (ref 1–32)
BILIRUB SERPL-MCNC: 0.5 MG/DL (ref 0–1.2)
BUN SERPL-MCNC: 5 MG/DL (ref 6–20)
BUN/CREAT SERPL: 10.2 (ref 7–25)
CALCIUM SPEC-SCNC: 8.7 MG/DL (ref 8.6–10.5)
CHLORIDE SERPL-SCNC: 101 MMOL/L (ref 98–107)
CO2 SERPL-SCNC: 17 MMOL/L (ref 22–29)
CREAT SERPL-MCNC: 0.49 MG/DL (ref 0.57–1)
EGFRCR SERPLBLD CKD-EPI 2021: 120.9 ML/MIN/1.73
GLOBULIN UR ELPH-MCNC: 3.1 GM/DL
GLUCOSE BLDC GLUCOMTR-MCNC: 170 MG/DL (ref 70–105)
GLUCOSE SERPL-MCNC: 223 MG/DL (ref 65–99)
MAGNESIUM SERPL-MCNC: 1.8 MG/DL (ref 1.6–2.6)
PHOSPHATE SERPL-MCNC: 1.5 MG/DL (ref 2.5–4.5)
POTASSIUM SERPL-SCNC: 3.8 MMOL/L (ref 3.5–5.2)
PROT SERPL-MCNC: 6.9 G/DL (ref 6–8.5)
SODIUM SERPL-SCNC: 131 MMOL/L (ref 136–145)
WHOLE BLOOD HOLD SPECIMEN: NORMAL

## 2023-07-27 PROCEDURE — 0 MAGNESIUM SULFATE 4 GM/100ML SOLUTION: Performed by: INTERNAL MEDICINE

## 2023-07-27 PROCEDURE — 80053 COMPREHEN METABOLIC PANEL: CPT | Performed by: INTERNAL MEDICINE

## 2023-07-27 PROCEDURE — 25010000002 HEPARIN (PORCINE) PER 1000 UNITS: Performed by: NURSE PRACTITIONER

## 2023-07-27 PROCEDURE — 63710000001 INSULIN LISPRO (HUMAN) PER 5 UNITS: Performed by: NURSE PRACTITIONER

## 2023-07-27 PROCEDURE — 82948 REAGENT STRIP/BLOOD GLUCOSE: CPT

## 2023-07-27 PROCEDURE — 63710000001 INSULIN LISPRO (HUMAN) PER 5 UNITS: Performed by: INTERNAL MEDICINE

## 2023-07-27 PROCEDURE — 83735 ASSAY OF MAGNESIUM: CPT | Performed by: INTERNAL MEDICINE

## 2023-07-27 PROCEDURE — 84100 ASSAY OF PHOSPHORUS: CPT | Performed by: INTERNAL MEDICINE

## 2023-07-27 PROCEDURE — 63710000001 INSULIN GLARGINE PER 5 UNITS: Performed by: NURSE PRACTITIONER

## 2023-07-27 RX ORDER — INSULIN ASPART INJECTION 100 [IU]/ML
INJECTION, SOLUTION SUBCUTANEOUS
Qty: 90 ML | Refills: 0 | Status: SHIPPED | OUTPATIENT
Start: 2023-07-27

## 2023-07-27 RX ORDER — INSULIN DETEMIR 100 [IU]/ML
30 INJECTION, SOLUTION SUBCUTANEOUS 2 TIMES DAILY
Qty: 60 ML | Refills: 0 | Status: SHIPPED | OUTPATIENT
Start: 2023-07-27

## 2023-07-27 RX ORDER — MAGNESIUM SULFATE HEPTAHYDRATE 40 MG/ML
4 INJECTION, SOLUTION INTRAVENOUS ONCE
Status: COMPLETED | OUTPATIENT
Start: 2023-07-27 | End: 2023-07-27

## 2023-07-27 RX ADMIN — HEPARIN SODIUM 5000 UNITS: 5000 INJECTION INTRAVENOUS; SUBCUTANEOUS at 05:50

## 2023-07-27 RX ADMIN — Medication 2 PACKET: at 02:06

## 2023-07-27 RX ADMIN — AMOXICILLIN AND CLAVULANATE POTASSIUM 1 TABLET: 875; 125 TABLET, FILM COATED ORAL at 09:02

## 2023-07-27 RX ADMIN — Medication 10 ML: at 09:02

## 2023-07-27 RX ADMIN — INSULIN LISPRO 2 UNITS: 100 INJECTION, SOLUTION INTRAVENOUS; SUBCUTANEOUS at 09:02

## 2023-07-27 RX ADMIN — FAMOTIDINE 20 MG: 10 INJECTION INTRAVENOUS at 09:02

## 2023-07-27 RX ADMIN — MAGNESIUM SULFATE HEPTAHYDRATE 4 G: 40 INJECTION, SOLUTION INTRAVENOUS at 02:06

## 2023-07-27 RX ADMIN — INSULIN GLARGINE 30 UNITS: 100 INJECTION, SOLUTION SUBCUTANEOUS at 09:02

## 2023-07-27 RX ADMIN — INSULIN LISPRO 14 UNITS: 100 INJECTION, SOLUTION INTRAVENOUS; SUBCUTANEOUS at 09:03

## 2023-07-27 RX ADMIN — Medication 10 ML: at 09:03

## 2023-07-27 NOTE — PLAN OF CARE
Goal Outcome Evaluation:   Patient is pleasant and cooperative with care. A&Ox4 and currently on room air. Evening blood glucose treated per insulin parameters. Critical phosphorus and low magnesium on labs. Replaced both electrolytes per protocol. Patient is not displaying S/S hyper/hypoglycemia. Patient appears to be resting comfortably with call light in reach.

## 2023-07-27 NOTE — PAYOR COMM NOTE
"Discharge notification for case# [id number]           Lisa Monae (42 y.o. Female)       Date of Birth   1980    Social Security Number       Address   [address] [place] [address]    Home Phone   [phone]    MRN   [id number]       [facility]   None    Marital Status                               Admission Date   23    Admission Type   Emergency    Admitting Provider   Sameer Tanner MD    Attending Provider       Department, Room/Bed   [facility],        Discharge Date   2023    Discharge Disposition   Home or Self Care    Discharge Destination                                 Attending Provider: (none)   Allergies: Codeine    Isolation: None   Infection: None   Code Status: Prior    Ht: 157.5 cm (62\")   Wt: 72.6 kg (160 lb 0.9 oz)    Admission Cmt: None   Principal Problem: DKA (diabetic ketoacidosis) [E11.10]                   Active Insurance as of 2023       Primary Coverage       Payor Plan Insurance Group Employer/Plan Group    [facility] [employer] [facility] [employer] [facility] [id number]       Payor Plan Address Payor Plan Phone Number Payor Plan Fax Number Effective Dates    [address] [phone]  2023 - None Entered    [facility] [id number]         Subscriber Name Subscriber Birth Date Member ID       LISA MONAE 1980 [id number]                     Emergency Contacts        (Rel.) Home Phone Work Phone Mobile Phone    POLLO BERGMAN (Father) -- -- [phone]                 Discharge Summary        Bill Bray DO at 23 0958                   [facility] Medicine Services   DISCHARGE SUMMARY    Patient Name: Lisa Monae  : 1980  MRN: [id number]    Date of Admission: 2023  Date of Discharge:  23    Primary Care Physician: Jody Saldana MD      Presenting Problem:   DKA (diabetic ketoacidosis) [E11.10]  Diabetic ketoacidosis without coma " associated with type 1 diabetes mellitus [E10.10]    Active and Resolved Hospital Problems:  Active Hospital Problems    Diagnosis POA    **DKA (diabetic ketoacidosis) [E11.10] Yes      Resolved Hospital Problems   No resolved problems to display.         Hospital Course     Hospital Course:  Lisa Lu is a 42 y.o. female with PMH of diabetes mellitus type 1 on insulin, fatty liver, hyperlipidemia, and congenital blindness, presented to the hospital for nausea with vomiting and elevated blood glucose, and was admitted with a principal diagnosis of DKA (diabetic ketoacidosis).  Patient reported that she has been out of her insulin for approximately 4 days, and she has attempted to obtain a early refill through her insurance company, but was told she had to go through her primary physician first.  She attempted to schedule an appointment with her primary physician, which was for later this week, but unfortunately began vomiting and presented to the hospital.  Initially admitted to ICU service and started on IV insulin drip and fluids.  Blood glucose and anion gap improved, weaned off insulin drip and restarted on subcu insulin at home dosing.  Endocrinology followed and recommending continue home dosing of insulin.  Hospitalist service consulted after downgrade.  Patient has been getting amoxicillin as outpatient for tooth infection, CT negative for any abscess.  Continue antibiotics on discharge to finish of the course.  Patient doing much better and tolerating diet well.  Blood glucose controlled.  No further inpatient work-up required.  Insulin prescription given on discharge..  Okay discharge per endocrinology.  Patient is medically stable to discharge home today with follow-up with PCP and endocrinology as an outpatient.        DISCHARGE Follow Up Recommendations for labs and diagnostics:   Follow-up with PCP/endocrinology    Reasons For Change In Medications and Indications for New Medications:  As noted  below    Day of Discharge     Vital Signs:  Temp:  [97.7 °F (36.5 °C)-98.3 °F (36.8 °C)] 98.1 °F (36.7 °C)  Heart Rate:  [] 91  Resp:  [11-20] 16  BP: (109-137)/(70-97) 128/90    Physical Exam:  General: Awake, alert, NAD  Eyes: PERRL, EOMI, conjunctivae are clear  Cardiovascular: Regular rate and rhythm, no murmurs  Respiratory: Clear to auscultation bilaterally, no wheezing or rales, unlabored breathing  Abdomen: Soft, nontender, positive bowel sounds, no guarding  Neurologic: A&O, CN grossly intact, moves all extremities spontaneously  Musculoskeletal: Normal range of motion, no other gross deformities  Skin: Warm, dry, intact        Pertinent  and/or Most Recent Results     LAB RESULTS:      Lab 07/26/23  1233 07/26/23  0707 07/25/23  2341 07/25/23  1541   WBC 6.10 7.70 10.40 13.00*   HEMOGLOBIN 11.2* 11.2* 11.5* 12.8   HEMATOCRIT 34.7 34.7 35.9 39.4   PLATELETS 223 247 272 330   NEUTROS ABS 4.00 4.80 8.00* 11.50*   LYMPHS ABS 1.30 1.70 1.40 0.90   MONOS ABS 0.60 1.10* 1.00* 0.40   EOS ABS 0.00 0.00 0.00 0.00   MCV 86.1 85.7 87.0 87.6         Lab 07/27/23  0008 07/26/23  1233 07/26/23  0654 07/25/23  2341 07/25/23  1946 07/25/23  1541   SODIUM 131* 136 135* 137 135* 128*   POTASSIUM 3.8 4.1 3.7 4.9 5.0 5.0   CHLORIDE 101 109* 107 110* 104 90*   CO2 17.0* 15.0* 17.0* 12.0* 10.0* 11.0*   ANION GAP 13.0 12.0 11.0 15.0 21.0* 27.0*   BUN 5* 6 9 13 18 21*   CREATININE 0.49* 0.54* 0.56* 0.74 0.82 1.01*   EGFR 120.9 118.1 117.0 103.7 91.7 71.4   GLUCOSE 223* 106* 141* 224* 303* 447*   CALCIUM 8.7 8.3* 8.8 8.5* 8.5* 9.5   MAGNESIUM 1.8  --  2.0 2.0 2.0 2.0   PHOSPHORUS 1.5*  --  1.4* 2.3* 3.7 5.1*   HEMOGLOBIN A1C  --   --   --   --   --  9.20*         Lab 07/27/23  0008 07/26/23  1233 07/26/23  0654 07/25/23  2341 07/25/23  1541   TOTAL PROTEIN 6.9 7.1 7.0 7.5 8.6*   ALBUMIN 3.8 3.4* 3.9 3.9 4.6   GLOBULIN 3.1 3.7  --  3.6 4.0   ALT (SGPT) 15 14 18 18 24   AST (SGOT) 10 10 16 12 16   BILIRUBIN 0.5 0.7 0.7 0.4 0.8    BILIRUBIN DIRECT  --   --  <0.2  --   --    ALK PHOS 63 60 66 70 86   LIPASE  --   --   --   --  15         Lab 07/25/23  1946 07/25/23  1541   HSTROP T 11* 9         Lab 07/25/23  2341   CHOLESTEROL 167   LDL CHOL 109*   HDL CHOL 42   TRIGLYCERIDES 87             Lab 07/25/23  1541   FIO2 21     Brief Urine Lab Results  (Last result in the past 365 days)        Color   Clarity   Blood   Leuk Est   Nitrite   Protein   CREAT   Urine HCG        07/25/23 1543               Negative             Microbiology Results (last 10 days)       Procedure Component Value - Date/Time    MRSA Screen, PCR (Inpatient) - Swab, Nares [[id number]]  (Normal) Collected: 07/25/23 1815    Lab Status: Final result Specimen: Swab from Nares Updated: 07/25/23 2119     MRSA PCR No MRSA Detected    Narrative:      The negative predictive value of this diagnostic test is high and should only be used to consider de-escalating anti-MRSA therapy. A positive result may indicate colonization with MRSA and must be correlated clinically.            XR Chest 1 View    Result Date: 7/25/2023  Impression: Impression: No acute pulmonary disease. Electronically Signed: Wale Johnson  7/25/2023 5:33 PM EDT  Workstation ID: [id number]    CT Facial Bones With Contrast    Result Date: 7/26/2023  Impression: Impression: 1. No subperiosteal abscess is identified. 2. Features of periodontal disease involving tooth roots in the maxilla and mandible. 3. Some of the maxillary tooth roots protrude to the floor of each maxillary sinus. Moderate right and mild left maxillary sinus mucosal thickening. Electronically Signed: Wendy Blackmon  7/26/2023 12:24 PM EDT  Workstation ID: [id number]                 Labs Pending at Discharge:      Procedures Performed           Consults:   Consults       Date and Time Order Name Status Description    7/26/2023  9:28 AM Inpatient Endocrinology Consult      7/26/2023  8:59 AM Inpatient Hospitalist Consult                Discharge  Details        Discharge Medications        New Medications        Instructions Start Date   Fiasp FlexTouch 100 UNIT/ML solution pen-injector  Generic drug: Insulin Aspart (w/Niacinamide)  Replaces: NovoLOG FlexPen 100 UNIT/ML solution pen-injector sc pen   INJECT 1 UNIT OF EACH 5 G OF CARBOHYDRATE BEFORE EACH MEAL and sliding scale 1 unit for each 30 mg/dLof blood sugar over 140 at meals only. *MAX DOSE 100 UNITS  Dx code: E10.65             Changes to Medications        Instructions Start Date   Levemir FlexPen 100 UNIT/ML injection  Generic drug: insulin detemir  What changed: additional instructions   30 Units, Subcutaneous, 2 Times Daily, Dx code: E10.65             Continue These Medications        Instructions Start Date   amoxicillin 500 MG capsule  Commonly known as: AMOXIL   500 mg, Oral, 3 Times Daily      loratadine 10 MG tablet  Commonly known as: CLARITIN   10 mg, Oral, Daily PRN             Stop These Medications      NovoLOG FlexPen 100 UNIT/ML solution pen-injector sc pen  Generic drug: insulin aspart  Replaced by: Fiasp FlexTouch 100 UNIT/ML solution pen-injector              Allergies   Allergen Reactions    Codeine Other (See Comments)     Memory loss            Discharge Disposition:  Home or Self Care    Diet:  Hospital:  Diet Order   Procedures    Diet: Diabetic Diets; Consistent Carbohydrate; Texture: Regular Texture (IDDSI 7); Fluid Consistency: Thin (IDDSI 0)         Discharge Activity:         CODE STATUS:  Code Status and Medical Interventions:   Ordered at: 07/25/23 0955     Code Status (Patient has no pulse and is not breathing):    CPR (Attempt to Resuscitate)     Medical Interventions (Patient has pulse or is breathing):    Full Support     Release to patient:    Routine Release         Future Appointments   Date Time Provider Department Center   8/28/2023  7:30 AM LAB  [facility] None   9/11/2023 12:45 PM Jody Saldana MD [facility]           Time spent on  Discharge including face to face service:  40 minutes    Signature:    Electronically signed by Bill Bray DO, 07/27/23, 9:58 AM EDT.      Part of this note may be an electronic transcription/translation of spoken language to printed text using the Dragon Dictation System.      Electronically signed by Bill Bray DO at 07/27/23 1000       Discharge Order (From admission, onward)       Start     Ordered    07/27/23 0807  Discharge patient  Once        Expected Discharge Date: 07/27/23   Discharge Disposition: Home or Self Care   Physician of Record for Attribution - Please select from Treatment Team: KWADWO ESPINOZA [[id number]]   Review needed by CMO to determine Physician of Record: No      Question Answer Comment   Physician of Record for Attribution - Please select from Treatment Team KWADWO ESPINOZA    Review needed by CMO to determine Physician of Record No        07/27/23 0807

## 2023-07-27 NOTE — CASE MANAGEMENT/SOCIAL WORK
Social Work Assessment  [facility]     Patient Name: Lias Lu  MRN: [id number]  Today's Date: 7/27/2023    Admit Date: 7/25/2023     Discharge Plan       Row Name 07/27/23 1547       Plan    Plan Comments Consulted related to food concerns/needs. Met with patient at bedside & supplied the [facility] Resource Guide to patient. Reviewed information available in the guide. Patient thanked this writer. No further needs.             ROBERT Sahu, MSSW, [facility]    Phone: [phone]  Cell: [phone]  Fax: [phone]  [email or web address]

## 2023-07-27 NOTE — CASE MANAGEMENT/SOCIAL WORK
Case Management Discharge Note      Final Note: Home with family    Provided Post Acute Provider List?: N/A  Provided Post Acute Provider Quality & Resource List?: N/A    Selected Continued Care - Discharged on 7/27/2023 Admission date: 7/25/2023 - Discharge disposition: Home or Self Care         Transportation Services  Private: Car    Final Discharge Disposition Code: 01 - home or self-care

## 2023-07-27 NOTE — DISCHARGE SUMMARY
[facility] Medicine Services   DISCHARGE SUMMARY    Patient Name: Lisa Lu  : 1980  MRN: [id number]    Date of Admission: 2023  Date of Discharge:  23    Primary Care Physician: Jody Saldana MD      Presenting Problem:   DKA (diabetic ketoacidosis) [E11.10]  Diabetic ketoacidosis without coma associated with type 1 diabetes mellitus [E10.10]    Active and Resolved Hospital Problems:  Active Hospital Problems    Diagnosis POA    **DKA (diabetic ketoacidosis) [E11.10] Yes      Resolved Hospital Problems   No resolved problems to display.         Hospital Course     Hospital Course:  Lisa Lu is a 42 y.o. female with PMH of diabetes mellitus type 1 on insulin, fatty liver, hyperlipidemia, and congenital blindness, presented to the hospital for nausea with vomiting and elevated blood glucose, and was admitted with a principal diagnosis of DKA (diabetic ketoacidosis).  Patient reported that she has been out of her insulin for approximately 4 days, and she has attempted to obtain a early refill through her insurance company, but was told she had to go through her primary physician first.  She attempted to schedule an appointment with her primary physician, which was for later this week, but unfortunately began vomiting and presented to the hospital.  Initially admitted to ICU service and started on IV insulin drip and fluids.  Blood glucose and anion gap improved, weaned off insulin drip and restarted on subcu insulin at home dosing.  Endocrinology followed and recommending continue home dosing of insulin.  Hospitalist service consulted after downgrade.  Patient has been getting amoxicillin as outpatient for tooth infection, CT negative for any abscess.  Continue antibiotics on discharge to finish of the course.  Patient doing much better and tolerating diet well.  Blood glucose controlled.  No further inpatient work-up required.  Insulin prescription given on  discharge..  Okay discharge per endocrinology.  Patient is medically stable to discharge home today with follow-up with PCP and endocrinology as an outpatient.        DISCHARGE Follow Up Recommendations for labs and diagnostics:   Follow-up with PCP/endocrinology    Reasons For Change In Medications and Indications for New Medications:  As noted below    Day of Discharge     Vital Signs:  Temp:  [97.7 °F (36.5 °C)-98.3 °F (36.8 °C)] 98.1 °F (36.7 °C)  Heart Rate:  [] 91  Resp:  [11-20] 16  BP: (109-137)/(70-97) 128/90    Physical Exam:  General: Awake, alert, NAD  Eyes: PERRL, EOMI, conjunctivae are clear  Cardiovascular: Regular rate and rhythm, no murmurs  Respiratory: Clear to auscultation bilaterally, no wheezing or rales, unlabored breathing  Abdomen: Soft, nontender, positive bowel sounds, no guarding  Neurologic: A&O, CN grossly intact, moves all extremities spontaneously  Musculoskeletal: Normal range of motion, no other gross deformities  Skin: Warm, dry, intact        Pertinent  and/or Most Recent Results     LAB RESULTS:      Lab 07/26/23  1233 07/26/23  0707 07/25/23  2341 07/25/23  1541   WBC 6.10 7.70 10.40 13.00*   HEMOGLOBIN 11.2* 11.2* 11.5* 12.8   HEMATOCRIT 34.7 34.7 35.9 39.4   PLATELETS 223 247 272 330   NEUTROS ABS 4.00 4.80 8.00* 11.50*   LYMPHS ABS 1.30 1.70 1.40 0.90   MONOS ABS 0.60 1.10* 1.00* 0.40   EOS ABS 0.00 0.00 0.00 0.00   MCV 86.1 85.7 87.0 87.6         Lab 07/27/23  0008 07/26/23  1233 07/26/23  0654 07/25/23  2341 07/25/23  1946 07/25/23  1541   SODIUM 131* 136 135* 137 135* 128*   POTASSIUM 3.8 4.1 3.7 4.9 5.0 5.0   CHLORIDE 101 109* 107 110* 104 90*   CO2 17.0* 15.0* 17.0* 12.0* 10.0* 11.0*   ANION GAP 13.0 12.0 11.0 15.0 21.0* 27.0*   BUN 5* 6 9 13 18 21*   CREATININE 0.49* 0.54* 0.56* 0.74 0.82 1.01*   EGFR 120.9 118.1 117.0 103.7 91.7 71.4   GLUCOSE 223* 106* 141* 224* 303* 447*   CALCIUM 8.7 8.3* 8.8 8.5* 8.5* 9.5   MAGNESIUM 1.8  --  2.0 2.0 2.0 2.0   PHOSPHORUS 1.5*   --  1.4* 2.3* 3.7 5.1*   HEMOGLOBIN A1C  --   --   --   --   --  9.20*         Lab 07/27/23  0008 07/26/23  1233 07/26/23  0654 07/25/23  2341 07/25/23  1541   TOTAL PROTEIN 6.9 7.1 7.0 7.5 8.6*   ALBUMIN 3.8 3.4* 3.9 3.9 4.6   GLOBULIN 3.1 3.7  --  3.6 4.0   ALT (SGPT) 15 14 18 18 24   AST (SGOT) 10 10 16 12 16   BILIRUBIN 0.5 0.7 0.7 0.4 0.8   BILIRUBIN DIRECT  --   --  <0.2  --   --    ALK PHOS 63 60 66 70 86   LIPASE  --   --   --   --  15         Lab 07/25/23  1946 07/25/23  1541   HSTROP T 11* 9         Lab 07/25/23  2341   CHOLESTEROL 167   LDL CHOL 109*   HDL CHOL 42   TRIGLYCERIDES 87             Lab 07/25/23  1541   FIO2 21     Brief Urine Lab Results  (Last result in the past 365 days)        Color   Clarity   Blood   Leuk Est   Nitrite   Protein   CREAT   Urine HCG        07/25/23 1543               Negative             Microbiology Results (last 10 days)       Procedure Component Value - Date/Time    MRSA Screen, PCR (Inpatient) - Swab, Nares [[id number]]  (Normal) Collected: 07/25/23 1815    Lab Status: Final result Specimen: Swab from Nares Updated: 07/25/23 2119     MRSA PCR No MRSA Detected    Narrative:      The negative predictive value of this diagnostic test is high and should only be used to consider de-escalating anti-MRSA therapy. A positive result may indicate colonization with MRSA and must be correlated clinically.            XR Chest 1 View    Result Date: 7/25/2023  Impression: Impression: No acute pulmonary disease. Electronically Signed: Wale Johnson  7/25/2023 5:33 PM EDT  Workstation ID: [id number]    CT Facial Bones With Contrast    Result Date: 7/26/2023  Impression: Impression: 1. No subperiosteal abscess is identified. 2. Features of periodontal disease involving tooth roots in the maxilla and mandible. 3. Some of the maxillary tooth roots protrude to the floor of each maxillary sinus. Moderate right and mild left maxillary sinus mucosal thickening. Electronically Signed: Wendy  Neymar  7/26/2023 12:24 PM EDT  Workstation ID: [id number]                 Labs Pending at Discharge:      Procedures Performed           Consults:   Consults       Date and Time Order Name Status Description    7/26/2023  9:28 AM Inpatient Endocrinology Consult      7/26/2023  8:59 AM Inpatient Hospitalist Consult                Discharge Details        Discharge Medications        New Medications        Instructions Start Date   Fiasp FlexTouch 100 UNIT/ML solution pen-injector  Generic drug: Insulin Aspart (w/Niacinamide)  Replaces: NovoLOG FlexPen 100 UNIT/ML solution pen-injector sc pen   INJECT 1 UNIT OF EACH 5 G OF CARBOHYDRATE BEFORE EACH MEAL and sliding scale 1 unit for each 30 mg/dLof blood sugar over 140 at meals only. *MAX DOSE 100 UNITS  Dx code: E10.65             Changes to Medications        Instructions Start Date   Levemir FlexPen 100 UNIT/ML injection  Generic drug: insulin detemir  What changed: additional instructions   30 Units, Subcutaneous, 2 Times Daily, Dx code: E10.65             Continue These Medications        Instructions Start Date   amoxicillin 500 MG capsule  Commonly known as: AMOXIL   500 mg, Oral, 3 Times Daily      loratadine 10 MG tablet  Commonly known as: CLARITIN   10 mg, Oral, Daily PRN             Stop These Medications      NovoLOG FlexPen 100 UNIT/ML solution pen-injector sc pen  Generic drug: insulin aspart  Replaced by: Fiasp FlexTouch 100 UNIT/ML solution pen-injector              Allergies   Allergen Reactions    Codeine Other (See Comments)     Memory loss            Discharge Disposition:  Home or Self Care    Diet:  Hospital:  Diet Order   Procedures    Diet: Diabetic Diets; Consistent Carbohydrate; Texture: Regular Texture (IDDSI 7); Fluid Consistency: Thin (IDDSI 0)         Discharge Activity:         CODE STATUS:  Code Status and Medical Interventions:   Ordered at: 07/25/23 7837     Code Status (Patient has no pulse and is not breathing):    CPR (Attempt to  Resuscitate)     Medical Interventions (Patient has pulse or is breathing):    Full Support     Release to patient:    Routine Release         Future Appointments   Date Time Provider Department Center   8/28/2023  7:30 AM LAB  [facility] None   9/11/2023 12:45 PM Jody Saldana MD [facility]           Time spent on Discharge including face to face service:  40 minutes    Signature:    Electronically signed by Bill Bray DO, 07/27/23, 9:58 AM EDT.      Part of this note may be an electronic transcription/translation of spoken language to printed text using the Dragon Dictation System.

## 2023-07-27 NOTE — NURSING NOTE
Discharge instructions given to patient who verbalized understanding. Both ivs removed with pressure dressing applied. All belongings with patient. Patient is waiting on her father to arrive.

## 2023-07-28 ENCOUNTER — READMISSION MANAGEMENT (OUTPATIENT)
Dept: CALL CENTER | Facility: HOSPITAL | Age: 43
End: 2023-07-28
Payer: COMMERCIAL

## 2023-07-28 NOTE — OUTREACH NOTE
Prep Survey      Flowsheet Row Responses   [facility] [employer] facility patient discharged from? [facility]   Is LACE score < 7 ? No   Eligibility Readm Mgmt   Discharge diagnosis diabetic ketoacidosis   Does the patient have one of the following disease processes/diagnoses(primary or secondary)? Other   Does the patient have Home health ordered? No   Is there a DME ordered? No   Prep survey completed? Yes            Karolyn YU - Registered Nurse

## 2023-08-01 ENCOUNTER — READMISSION MANAGEMENT (OUTPATIENT)
Dept: CALL CENTER | Facility: HOSPITAL | Age: 43
End: 2023-08-01
Payer: COMMERCIAL

## 2023-08-15 RX ORDER — INSULIN ASPART 100 [IU]/ML
INJECTION, SOLUTION INTRAVENOUS; SUBCUTANEOUS
Qty: 90 ML | Refills: 0 | Status: SHIPPED | OUTPATIENT
Start: 2023-08-15

## 2023-08-15 NOTE — TELEPHONE ENCOUNTER
Patient called stating in order for insurance to cover Novolog it must be a 90 day supply. Chart says Fiasp. Patient states she was in hospital recently and she thinks that is what they were giving her while in the hospital but at home she is on Novolog pen.    Rx loaded and ready to send.    Per [employer] policy, when refills come in if there are any red check marks or if it was a paper/verbal request it has to go to the provider to approve. Forwarding Script to provider.

## 2024-06-04 ENCOUNTER — HOSPITAL ENCOUNTER (OUTPATIENT)
Facility: HOSPITAL | Age: 44
Discharge: HOME | End: 2024-06-04
Attending: EMERGENCY MEDICINE | Admitting: EMERGENCY MEDICINE

## 2024-06-04 VITALS
SYSTOLIC BLOOD PRESSURE: 108 MMHG | HEART RATE: 96 BPM | RESPIRATION RATE: 18 BRPM | OXYGEN SATURATION: 98 % | DIASTOLIC BLOOD PRESSURE: 77 MMHG | TEMPERATURE: 97.9 F | BODY MASS INDEX: 30.99 KG/M2 | HEIGHT: 62 IN | WEIGHT: 168.4 LBS

## 2024-06-04 DIAGNOSIS — H60.501 ACUTE OTITIS EXTERNA OF RIGHT EAR, UNSPECIFIED TYPE: Primary | ICD-10-CM

## 2024-06-04 DIAGNOSIS — H61.21 RIGHT EAR IMPACTED CERUMEN: ICD-10-CM

## 2024-06-04 PROCEDURE — G0463 HOSPITAL OUTPT CLINIC VISIT: HCPCS

## 2024-06-04 RX ORDER — CIPROFLOXACIN AND DEXAMETHASONE 3; 1 MG/ML; MG/ML
4 SUSPENSION/ DROPS AURICULAR (OTIC) 2 TIMES DAILY
Qty: 7.5 ML | Refills: 0 | Status: SHIPPED | OUTPATIENT
Start: 2024-06-04 | End: 2024-06-11

## 2024-06-04 NOTE — DISCHARGE INSTRUCTIONS
Thank you for letting us care for you today. Use the ear drops as prescribed for the next 7 days. Follow up with your primary care provider for continued evaluation. I also recommend following up with the ENT group listed below for continued evaluation.  Return to the emergency room for any new or worsening symptoms.  I recommend taking your allergy medication daily.    Wash your hands with soap and water for at least 20 seconds. If you cannot use soap and water, use hand .  Make sure your ears are clean and dry.  If there is earwax or fluid at the outer part of the ear canal, wipe it out gently with a cotton-tipped swab.  Warm the medicine by holding it in your hand for a few minutes.  Shake the medicine gently to mix the ear drops.  Use the dropper to draw up the ear drops. You will need to squeeze the round part of the dropper to do this.  Put the drops in your ear as told. Hold the dropper above your ear. Do not let the dropper touch your ear. The medicine may go in more easily if you pull the flap of your ear up and back while you put the drops in.  To make sure your ear soaks up the medicine, do one of these things:  Lie down for 10 minutes. The ear with the medicine in it should face up. This will cause the drops to stay in the ear canal and fill the canal.  Put a cotton ball in your ear. Do not push it deeper into your ear. Take out the cotton ball when the drops have been soaked up or after 15-30 minutes have passed.  If you need to put drops in your other ear, repeat the same steps. Your doctor will tell you if you should put drops in both ears.  Wash your hands with soap and water for at least 20 seconds after using ear drops. If you cannot use soap and water, use hand .

## 2024-06-04 NOTE — FSED PROVIDER NOTE
[facility]    EMERGENCY DEPARTMENT ENCOUNTER    Room Number:  12/12  Date seen:  6/4/2024  Time seen: 17:45 EDT  PCP: Elsi Covington MD  Historian: patient    HPI:  Chief complaint:ear fullness  Context:Lisa Lu is a 43 y.o. female who presents to the ED with c/o ear fullness.  Patient reports that she has been having right ear fullness and pressure with sharp pain.  Reports that the pain is intermittent but the fullness is constant.  Reports that she has been having a lot of trouble with her sinuses this year.  She reports that she has had to have earwax removed in the past and this does feel similar.  She reports that she has had some decreased hearing.  Patient denies any fever, sore throat, difficulty swallowing.  Patient is nontoxic in appearance.    Timing: Constant  Duration: Several days  Location: Right ear  Radiation: Nonradiating  Quality: Fullness  Intensity/Severity: Mild to moderate  Associated Symptoms: Ear fullness, pressure, pain decreased hearing  Aggravating Factors: No known aggravating  Alleviating Factors: None alleviating      MEDICAL RECORD REVIEW  Diabetes, hyperlipidemia    ALLERGIES  Codeine    PAST MEDICAL HISTORY  Active Ambulatory Problems     Diagnosis Date Noted    Congenital blindness 10/27/2015    Encounter for immunization 09/07/2016    Fatty liver 10/27/2015    Mixed hyperlipidemia 09/21/2016    Long term current use of insulin 04/05/2017    Type 1 diabetes mellitus with hyperglycemia 04/05/2017    Congenital blindness 05/13/2018    Primigravida of advanced maternal age 01/01/2018    Labor and delivery, indication for care 11/18/2018    Iritis, recurrent, bilateral 10/01/2015    DKA (diabetic ketoacidosis) 07/25/2023     Resolved Ambulatory Problems     Diagnosis Date Noted    No Resolved Ambulatory Problems     Past Medical History:   Diagnosis Date    Diabetes mellitus type I     Hyperlipidemia        PAST SURGICAL  HISTORY  Past Surgical History:   Procedure Laterality Date     SECTION         FAMILY HISTORY  Family History   Problem Relation Age of Onset    Bipolar disorder Mother     Schizophrenia Mother     Ulcers Father     Asthma Sister        SOCIAL HISTORY  Social History     Socioeconomic History    Marital status:    Tobacco Use    Smoking status: Never    Smokeless tobacco: Never   Vaping Use    Vaping status: Never Used   Substance and Sexual Activity    Alcohol use: Never    Drug use: Never    Sexual activity: Defer       REVIEW OF SYSTEMS  Review of Systems    All systems reviewed and negative except for those discussed in HPI.     PHYSICAL EXAM    I have reviewed the triage vital signs and nursing notes.    ED Triage Vitals [24 1727]   Temp Heart Rate Resp BP SpO2   97.9 °F (36.6 °C) 96 18 108/77 98 %      Temp src Heart Rate Source Patient Position BP Location FiO2 (%)   -- Monitor Sitting Right arm --       Physical Exam  Constitutional:       Appearance: Normal appearance. She is not toxic-appearing.   HENT:      Right Ear: Swelling and tenderness present. There is impacted cerumen. No foreign body. No mastoid tenderness. Tympanic membrane is not erythematous or bulging.      Left Ear: Tympanic membrane and ear canal normal.      Nose: Nose normal.      Mouth/Throat:      Mouth: Mucous membranes are moist.      Pharynx: Oropharynx is clear.   Eyes:      Conjunctiva/sclera: Conjunctivae normal.      Pupils: Pupils are equal, round, and reactive to light.   Cardiovascular:      Rate and Rhythm: Normal rate and regular rhythm.      Pulses: Normal pulses.      Heart sounds: Normal heart sounds.   Pulmonary:      Effort: Pulmonary effort is normal.      Breath sounds: Normal breath sounds.   Musculoskeletal:         General: Normal range of motion.      Cervical back: Normal range of motion.   Skin:     General: Skin is warm.   Neurological:      General: No focal deficit present.      Mental  Status: She is alert.   Psychiatric:         Mood and Affect: Mood normal.         Behavior: Behavior normal.         Vital signs and nursing notes reviewed.        LAB RESULTS  No results found for this or any previous visit (from the past 24 hour(s)).    Ordered the above labs and independently reviewed the results.      RADIOLOGY RESULTS  No Radiology Exams Resulted Within Past 24 Hours       I ordered the above noted radiological studies. Independently reviewed by me and discussed with radiologist.  See dictation above for official radiology interpretation.      Orders placed during this visit:  Orders Placed This Encounter   Procedures    Ear Cerumen Removal           PROCEDURES    Ear Cerumen Removal Instrumentation    Date/Time: 6/4/2024 5:48 PM    Performed by: Karen Pedersen APRN  Authorized by: Fidencio Kirby MD    Consent:     Consent obtained:  Verbal    Consent given by:  Patient and parent    Risks, benefits, and alternatives were discussed: yes      Risks discussed:  Bleeding, infection, pain, TM perforation, incomplete removal and dizziness    Alternatives discussed:  No treatment  Universal protocol:     Patient identity confirmed:  Verbally with patient and arm band  Procedure details:     Location:  R ear    Procedure type: irrigation      Procedure outcomes: cerumen removed    Post-procedure details:     Inspection:  No bleeding    Hearing quality:  Improved    Procedure completion:  Tolerated well, no immediate complications          MEDICATIONS GIVEN IN ER    Medications - No data to display      PROGRESS, DATA ANALYSIS, CONSULTS, AND MEDICAL DECISION MAKING    All labs and radiology studies have been independently reviewed by me.          AS OF 17:50 EDT VITALS:    BP - 108/77  HR - 96  TEMP - 97.9 °F (36.6 °C)  02 SATS - 98%    Medical Decision Making  MEDICAL DECISION  Patient is a 43-year-old female who presents today with right ear fullness, decreased hearing, pressure and pain.   Patient presenting with cerumen inpaction.  Cerumen impaction noted and irrigation was indicated. Performed cerumen removal by irrigation w/ H2O. No trauma or complications noted.  Patient was noted to have otitis externa on the right ear. Pt reports hearing restored.  Will send the patient home with a prescription for Ciprodex.  We discussed her discharge instructions.  Recommend that she follow-up with her primary care provider and ENT for continued evaluation.  She was given strict return precautions with understanding.    Problems Addressed:  Acute otitis externa of right ear, unspecified type: complicated acute illness or injury  Right ear impacted cerumen: complicated acute illness or injury    Risk  Prescription drug management.          DIAGNOSIS  Final diagnoses:   Acute otitis externa of right ear, unspecified type   Right ear impacted cerumen       New Medications Ordered This Visit   Medications    ciprofloxacin-dexAMETHasone (CIPRODEX) 0.3-0.1 % otic suspension     Sig: Administer 4 drops to the right ear 2 (Two) Times a Day for 7 days.     Dispense:  7.5 mL     Refill:  0           I performed hand hygiene on entry into the pt room and upon exit.      Part of this note may be an electronic transcription/translation of spoken language to printed text using the Dragon Dictation System.     Appropriate PPE worn during exam.    Dictated utilizing Dragon dictation     Note Disclaimer: At [facility], we believe that sharing information builds trust and better relationships. You are receiving this note because you recently visited [facility]. It is possible you will see health information before a provider has talked with you about it. This kind of information can be easy to misunderstand. To help you fully understand what it means for your health, we urge you to discuss this note with your provider.

## 2024-11-22 ENCOUNTER — HOSPITAL ENCOUNTER (EMERGENCY)
Facility: HOSPITAL | Age: 44
Discharge: HOME | End: 2024-11-22
Attending: EMERGENCY MEDICINE
Payer: COMMERCIAL

## 2024-11-22 VITALS
TEMPERATURE: 97.5 F | BODY MASS INDEX: 31.28 KG/M2 | SYSTOLIC BLOOD PRESSURE: 122 MMHG | HEART RATE: 114 BPM | WEIGHT: 170 LBS | RESPIRATION RATE: 16 BRPM | DIASTOLIC BLOOD PRESSURE: 81 MMHG | OXYGEN SATURATION: 100 % | HEIGHT: 62 IN

## 2024-11-22 DIAGNOSIS — R73.9 HYPERGLYCEMIA: Primary | ICD-10-CM

## 2024-11-22 LAB
ANION GAP SERPL CALCULATED.3IONS-SCNC: 10.8 MMOL/L (ref 5–15)
BASOPHILS # BLD AUTO: 0.02 10*3/MM3 (ref 0–0.2)
BASOPHILS NFR BLD AUTO: 0.2 % (ref 0–1.5)
BILIRUB UR QL STRIP: NEGATIVE
BUN SERPL-MCNC: 12 MG/DL (ref 6–20)
BUN/CREAT SERPL: 21.1 (ref 7–25)
CALCIUM SPEC-SCNC: 8.9 MG/DL (ref 8.6–10.5)
CHLORIDE SERPL-SCNC: 99 MMOL/L (ref 98–107)
CLARITY UR: CLEAR
CO2 SERPL-SCNC: 26.2 MMOL/L (ref 22–29)
COLOR UR: YELLOW
CREAT SERPL-MCNC: 0.57 MG/DL (ref 0.57–1)
DEPRECATED RDW RBC AUTO: 40 FL (ref 37–54)
EGFRCR SERPLBLD CKD-EPI 2021: 115.1 ML/MIN/1.73
EOSINOPHIL # BLD AUTO: 0.07 10*3/MM3 (ref 0–0.4)
EOSINOPHIL NFR BLD AUTO: 0.7 % (ref 0.3–6.2)
ERYTHROCYTE [DISTWIDTH] IN BLOOD BY AUTOMATED COUNT: 12.8 % (ref 12.3–15.4)
GLUCOSE BLDC GLUCOMTR-MCNC: 220 MG/DL (ref 70–105)
GLUCOSE SERPL-MCNC: 222 MG/DL (ref 65–99)
GLUCOSE UR STRIP-MCNC: ABNORMAL MG/DL
HCT VFR BLD AUTO: 37.2 % (ref 34–46.6)
HGB BLD-MCNC: 12.3 G/DL (ref 12–15.9)
HGB UR QL STRIP.AUTO: NEGATIVE
HOLD SPECIMEN: NORMAL
HOLD SPECIMEN: NORMAL
IMM GRANULOCYTES # BLD AUTO: 0.02 10*3/MM3 (ref 0–0.05)
IMM GRANULOCYTES NFR BLD AUTO: 0.2 % (ref 0–0.5)
KETONES UR QL STRIP: NEGATIVE
LEUKOCYTE ESTERASE UR QL STRIP.AUTO: NEGATIVE
LYMPHOCYTES # BLD AUTO: 0.57 10*3/MM3 (ref 0.7–3.1)
LYMPHOCYTES NFR BLD AUTO: 5.9 % (ref 19.6–45.3)
MCH RBC QN AUTO: 28.4 PG (ref 26.6–33)
MCHC RBC AUTO-ENTMCNC: 33.1 G/DL (ref 31.5–35.7)
MCV RBC AUTO: 85.9 FL (ref 79–97)
MONOCYTES # BLD AUTO: 0.62 10*3/MM3 (ref 0.1–0.9)
MONOCYTES NFR BLD AUTO: 6.5 % (ref 5–12)
NEUTROPHILS NFR BLD AUTO: 8.31 10*3/MM3 (ref 1.7–7)
NEUTROPHILS NFR BLD AUTO: 86.5 % (ref 42.7–76)
NITRITE UR QL STRIP: NEGATIVE
NRBC BLD AUTO-RTO: 0 /100 WBC (ref 0–0.2)
PH UR STRIP.AUTO: 5.5 [PH] (ref 5–8)
PLATELET # BLD AUTO: 246 10*3/MM3 (ref 140–450)
PMV BLD AUTO: 10.4 FL (ref 6–12)
POTASSIUM SERPL-SCNC: 4.2 MMOL/L (ref 3.5–5.2)
PROT UR QL STRIP: NEGATIVE
RBC # BLD AUTO: 4.33 10*6/MM3 (ref 3.77–5.28)
SODIUM SERPL-SCNC: 136 MMOL/L (ref 136–145)
SP GR UR STRIP: 1.01 (ref 1–1.03)
UROBILINOGEN UR QL STRIP: ABNORMAL
WBC NRBC COR # BLD AUTO: 9.61 10*3/MM3 (ref 3.4–10.8)
WHOLE BLOOD HOLD COAG: NORMAL
WHOLE BLOOD HOLD SPECIMEN: NORMAL

## 2024-11-22 PROCEDURE — 82948 REAGENT STRIP/BLOOD GLUCOSE: CPT

## 2024-11-22 PROCEDURE — 80048 BASIC METABOLIC PNL TOTAL CA: CPT | Performed by: EMERGENCY MEDICINE

## 2024-11-22 PROCEDURE — 99283 EMERGENCY DEPT VISIT LOW MDM: CPT

## 2024-11-22 PROCEDURE — 81003 URINALYSIS AUTO W/O SCOPE: CPT | Performed by: EMERGENCY MEDICINE

## 2024-11-22 PROCEDURE — 85025 COMPLETE CBC W/AUTO DIFF WBC: CPT | Performed by: EMERGENCY MEDICINE

## 2024-11-22 RX ORDER — SODIUM CHLORIDE 0.9 % (FLUSH) 0.9 %
10 SYRINGE (ML) INJECTION AS NEEDED
Status: DISCONTINUED | OUTPATIENT
Start: 2024-11-22 | End: 2024-11-22 | Stop reason: HOSPADM

## 2024-11-22 NOTE — Clinical Note
[facility] EMERGENCY DEPARTMENT  [address] [place] [address]  Phone: [phone]    Lisa Lu was seen and treated in our emergency department on 11/22/2024.  She may return to work on 11/25/2024.         Thank you for choosing [facility].    Fidencio Solis MD

## 2024-11-22 NOTE — ED PROVIDER NOTES
Subjective   History of Present Illness  Patient is a 44-year-old female who states to currently being treated for sinusitis with medication started yesterday.  She states she has a history of insulin dependent diabetes and is having difficulty controlling her glucose.  She wants to make sure she is not in DKA as she has polyuria and polydipsia.  She denies fever vomiting diarrhea dysuria or other complaint      Review of Systems    Past Medical History:   Diagnosis Date    Diabetes mellitus type I     Hyperlipidemia        Allergies   Allergen Reactions    Codeine Other (See Comments)     Memory loss          Past Surgical History:   Procedure Laterality Date     SECTION         Family History   Problem Relation Age of Onset    Bipolar disorder Mother     Schizophrenia Mother     Ulcers Father     Asthma Sister        Social History     Socioeconomic History    Marital status:    Tobacco Use    Smoking status: Never    Smokeless tobacco: Never   Vaping Use    Vaping status: Never Used   Substance and Sexual Activity    Alcohol use: Never    Drug use: Never    Sexual activity: Defer           Objective   Physical Exam  Neck has no adenopathy JVD or bruits.  Lungs are clear.  Heart has regular rate rhythm without murmur rub or gallop.  Chest is nontender.  Abdomen is soft nontender.  Back has no CVA tenderness.  Remainder of exam unremarkable.  Procedures           ED Course      Results for orders placed or performed during the hospital encounter of 24   POC Glucose Once    Collection Time: 24 12:27 PM    Specimen: Blood   Result Value Ref Range    Glucose 220 (H) 70 - 105 mg/dL   Urinalysis With Culture If Indicated - Urine, Clean Catch    Collection Time: 24 12:47 PM    Specimen: Urine, Clean Catch   Result Value Ref Range    Color, UA Yellow Yellow, Straw    Appearance, UA Clear Clear    pH, UA 5.5 5.0 - 8.0    Specific Gravity, UA 1.015 1.005 - 1.030    Glucose,  mg/dL (2+)  (A) Negative    Ketones, UA Negative Negative    Bilirubin, UA Negative Negative    Blood, UA Negative Negative    Protein, UA Negative Negative    Leuk Esterase, UA Negative Negative    Nitrite, UA Negative Negative    Urobilinogen, UA 1.0 E.U./dL 0.2 - 1.0 E.U./dL   Basic Metabolic Panel    Collection Time: 11/22/24 12:47 PM    Specimen: Blood   Result Value Ref Range    Glucose 222 (H) 65 - 99 mg/dL    BUN 12 6 - 20 mg/dL    Creatinine 0.57 0.57 - 1.00 mg/dL    Sodium 136 136 - 145 mmol/L    Potassium 4.2 3.5 - 5.2 mmol/L    Chloride 99 98 - 107 mmol/L    CO2 26.2 22.0 - 29.0 mmol/L    Calcium 8.9 8.6 - 10.5 mg/dL    BUN/Creatinine Ratio 21.1 7.0 - 25.0    Anion Gap 10.8 5.0 - 15.0 mmol/L    eGFR 115.1 >60.0 mL/min/1.73   CBC Auto Differential    Collection Time: 11/22/24 12:47 PM    Specimen: Blood   Result Value Ref Range    WBC 9.61 3.40 - 10.80 10*3/mm3    RBC 4.33 3.77 - 5.28 10*6/mm3    Hemoglobin 12.3 12.0 - 15.9 g/dL    Hematocrit 37.2 34.0 - 46.6 %    MCV 85.9 79.0 - 97.0 fL    MCH 28.4 26.6 - 33.0 pg    MCHC 33.1 31.5 - 35.7 g/dL    RDW 12.8 12.3 - 15.4 %    RDW-SD 40.0 37.0 - 54.0 fl    MPV 10.4 6.0 - 12.0 fL    Platelets 246 140 - 450 10*3/mm3    Neutrophil % 86.5 (H) 42.7 - 76.0 %    Lymphocyte % 5.9 (L) 19.6 - 45.3 %    Monocyte % 6.5 5.0 - 12.0 %    Eosinophil % 0.7 0.3 - 6.2 %    Basophil % 0.2 0.0 - 1.5 %    Immature Grans % 0.2 0.0 - 0.5 %    Neutrophils, Absolute 8.31 (H) 1.70 - 7.00 10*3/mm3    Lymphocytes, Absolute 0.57 (L) 0.70 - 3.10 10*3/mm3    Monocytes, Absolute 0.62 0.10 - 0.90 10*3/mm3    Eosinophils, Absolute 0.07 0.00 - 0.40 10*3/mm3    Basophils, Absolute 0.02 0.00 - 0.20 10*3/mm3    Immature Grans, Absolute 0.02 0.00 - 0.05 10*3/mm3    nRBC 0.0 0.0 - 0.2 /100 WBC   Green Top (Gel)    Collection Time: 11/22/24 12:47 PM   Result Value Ref Range    Extra Tube Hold for add-ons.    Lavender Top    Collection Time: 11/22/24 12:47 PM   Result Value Ref Range    Extra Tube hold for add-on     Gold Top - SST    Collection Time: 11/22/24 12:47 PM   Result Value Ref Range    Extra Tube Hold for add-ons.    Light Blue Top    Collection Time: 11/22/24 12:47 PM   Result Value Ref Range    Extra Tube Hold for add-ons.                                                       Medical Decision Making  BMP shows no renal insufficiency or electrolyte abnormality.  CO2 is 26.  Glucose is 222.  Patient has no leukocytosis no left shift and no anemia.  UA shows no evidence of urinary tract infection.  Patient will be discharged.  She will continue drink plenty of fluids and monitor glucose closely and follow with her MD for recheck as needed.    Amount and/or Complexity of Data Reviewed  Labs: ordered. Decision-making details documented in ED Course.    Risk  Prescription drug management.        Final diagnoses:   Hyperglycemia       ED Disposition  ED Disposition       ED Disposition   Discharge    Condition   Stable    Comment   --               No follow-up provider specified.       Medication List      No changes were made to your prescriptions during this visit.            Fidencio Solis MD  11/22/24 9996

## 2025-06-13 ENCOUNTER — HOSPITAL ENCOUNTER (EMERGENCY)
Facility: HOSPITAL | Age: 45
Discharge: HOME | End: 2025-06-13
Attending: EMERGENCY MEDICINE
Payer: COMMERCIAL

## 2025-06-13 VITALS
TEMPERATURE: 98.7 F | BODY MASS INDEX: 31.93 KG/M2 | OXYGEN SATURATION: 97 % | HEART RATE: 90 BPM | HEIGHT: 62 IN | DIASTOLIC BLOOD PRESSURE: 69 MMHG | WEIGHT: 173.5 LBS | SYSTOLIC BLOOD PRESSURE: 114 MMHG | RESPIRATION RATE: 16 BRPM

## 2025-06-13 DIAGNOSIS — M79.10 MYALGIA: Primary | ICD-10-CM

## 2025-06-13 DIAGNOSIS — E10.649 UNCONTROLLED TYPE 1 DIABETES MELLITUS WITH HYPOGLYCEMIA WITHOUT COMA: ICD-10-CM

## 2025-06-13 LAB
ALBUMIN SERPL-MCNC: 4.1 G/DL (ref 3.5–5.2)
ALBUMIN/GLOB SERPL: 1.1 G/DL
ALP SERPL-CCNC: 69 U/L (ref 39–117)
ALT SERPL W P-5'-P-CCNC: 39 U/L (ref 1–33)
ANION GAP SERPL CALCULATED.3IONS-SCNC: 10.4 MMOL/L (ref 5–15)
AST SERPL-CCNC: 31 U/L (ref 1–32)
B PARAPERT DNA SPEC QL NAA+PROBE: NOT DETECTED
B PERT DNA SPEC QL NAA+PROBE: NOT DETECTED
BASOPHILS # BLD AUTO: 0.01 10*3/MM3 (ref 0–0.2)
BASOPHILS NFR BLD AUTO: 0.3 % (ref 0–1.5)
BILIRUB SERPL-MCNC: 0.5 MG/DL (ref 0–1.2)
BUN SERPL-MCNC: 9 MG/DL (ref 6–20)
BUN/CREAT SERPL: 13 (ref 7–25)
C PNEUM DNA NPH QL NAA+NON-PROBE: NOT DETECTED
CALCIUM SPEC-SCNC: 8.9 MG/DL (ref 8.6–10.5)
CHLORIDE SERPL-SCNC: 100 MMOL/L (ref 98–107)
CO2 SERPL-SCNC: 23.6 MMOL/L (ref 22–29)
CREAT SERPL-MCNC: 0.69 MG/DL (ref 0.57–1)
DEPRECATED RDW RBC AUTO: 41.1 FL (ref 37–54)
EGFRCR SERPLBLD CKD-EPI 2021: 109.9 ML/MIN/1.73
EOSINOPHIL # BLD AUTO: 0.12 10*3/MM3 (ref 0–0.4)
EOSINOPHIL NFR BLD AUTO: 3.7 % (ref 0.3–6.2)
ERYTHROCYTE [DISTWIDTH] IN BLOOD BY AUTOMATED COUNT: 13.3 % (ref 12.3–15.4)
FLUAV SUBTYP SPEC NAA+PROBE: NOT DETECTED
FLUBV RNA ISLT QL NAA+PROBE: NOT DETECTED
GLOBULIN UR ELPH-MCNC: 3.8 GM/DL
GLUCOSE SERPL-MCNC: 391 MG/DL (ref 65–99)
HADV DNA SPEC NAA+PROBE: NOT DETECTED
HCOV 229E RNA SPEC QL NAA+PROBE: NOT DETECTED
HCOV HKU1 RNA SPEC QL NAA+PROBE: NOT DETECTED
HCOV NL63 RNA SPEC QL NAA+PROBE: NOT DETECTED
HCOV OC43 RNA SPEC QL NAA+PROBE: NOT DETECTED
HCT VFR BLD AUTO: 35.5 % (ref 34–46.6)
HETEROPH AB SER QL LA: NEGATIVE
HGB BLD-MCNC: 11.3 G/DL (ref 12–15.9)
HMPV RNA NPH QL NAA+NON-PROBE: NOT DETECTED
HOLD SPECIMEN: NORMAL
HPIV1 RNA ISLT QL NAA+PROBE: NOT DETECTED
HPIV2 RNA SPEC QL NAA+PROBE: NOT DETECTED
HPIV3 RNA NPH QL NAA+PROBE: NOT DETECTED
HPIV4 P GENE NPH QL NAA+PROBE: NOT DETECTED
IMM GRANULOCYTES # BLD AUTO: 0.01 10*3/MM3 (ref 0–0.05)
IMM GRANULOCYTES NFR BLD AUTO: 0.3 % (ref 0–0.5)
LYMPHOCYTES # BLD AUTO: 0.86 10*3/MM3 (ref 0.7–3.1)
LYMPHOCYTES NFR BLD AUTO: 26.2 % (ref 19.6–45.3)
M PNEUMO IGG SER IA-ACNC: NOT DETECTED
MCH RBC QN AUTO: 26.9 PG (ref 26.6–33)
MCHC RBC AUTO-ENTMCNC: 31.8 G/DL (ref 31.5–35.7)
MCV RBC AUTO: 84.5 FL (ref 79–97)
MONOCYTES # BLD AUTO: 0.68 10*3/MM3 (ref 0.1–0.9)
MONOCYTES NFR BLD AUTO: 20.7 % (ref 5–12)
NEUTROPHILS NFR BLD AUTO: 1.6 10*3/MM3 (ref 1.7–7)
NEUTROPHILS NFR BLD AUTO: 48.8 % (ref 42.7–76)
NRBC BLD AUTO-RTO: 0 /100 WBC (ref 0–0.2)
PLATELET # BLD AUTO: 176 10*3/MM3 (ref 140–450)
PMV BLD AUTO: 10.7 FL (ref 6–12)
POTASSIUM SERPL-SCNC: 4.4 MMOL/L (ref 3.5–5.2)
PROT SERPL-MCNC: 7.9 G/DL (ref 6–8.5)
RBC # BLD AUTO: 4.2 10*6/MM3 (ref 3.77–5.28)
RHINOVIRUS RNA SPEC NAA+PROBE: NOT DETECTED
RSV RNA NPH QL NAA+NON-PROBE: NOT DETECTED
S PYO AG THROAT QL: NEGATIVE
SARS-COV-2 RNA RESP QL NAA+PROBE: NOT DETECTED
SODIUM SERPL-SCNC: 134 MMOL/L (ref 136–145)
WBC NRBC COR # BLD AUTO: 3.28 10*3/MM3 (ref 3.4–10.8)

## 2025-06-13 PROCEDURE — 86308 HETEROPHILE ANTIBODY SCREEN: CPT | Performed by: EMERGENCY MEDICINE

## 2025-06-13 PROCEDURE — 99283 EMERGENCY DEPT VISIT LOW MDM: CPT

## 2025-06-13 PROCEDURE — 36415 COLL VENOUS BLD VENIPUNCTURE: CPT

## 2025-06-13 PROCEDURE — 80053 COMPREHEN METABOLIC PANEL: CPT | Performed by: EMERGENCY MEDICINE

## 2025-06-13 PROCEDURE — 87484 EHRLICHA CHAFFEENSIS AMP PRB: CPT | Performed by: EMERGENCY MEDICINE

## 2025-06-13 PROCEDURE — 85025 COMPLETE CBC W/AUTO DIFF WBC: CPT | Performed by: EMERGENCY MEDICINE

## 2025-06-13 PROCEDURE — 87468 ANAPLSMA PHGCYTOPHLM AMP PRB: CPT | Performed by: EMERGENCY MEDICINE

## 2025-06-13 PROCEDURE — 63710000001 INSULIN GLARGINE PER 5 UNITS: Performed by: EMERGENCY MEDICINE

## 2025-06-13 PROCEDURE — 87798 DETECT AGENT NOS DNA AMP: CPT | Performed by: EMERGENCY MEDICINE

## 2025-06-13 PROCEDURE — 0202U NFCT DS 22 TRGT SARS-COV-2: CPT | Performed by: EMERGENCY MEDICINE

## 2025-06-13 PROCEDURE — 87651 STREP A DNA AMP PROBE: CPT | Performed by: EMERGENCY MEDICINE

## 2025-06-13 RX ORDER — DOXYCYCLINE 100 MG/1
100 CAPSULE ORAL 2 TIMES DAILY
Qty: 14 CAPSULE | Refills: 0 | Status: SHIPPED | OUTPATIENT
Start: 2025-06-13

## 2025-06-13 RX ADMIN — INSULIN GLARGINE 10 UNITS: 100 INJECTION, SOLUTION SUBCUTANEOUS at 09:33

## 2025-06-13 NOTE — ED PROVIDER NOTES
Subjective   History of Present Illness  44-year-old female presents with complaints of scratchy throat that started Tuesday.  She has had some bodyaches cough congestion headache.  She had taken cold and flu medication.  She has had no vomiting no diarrhea.  She has had mild cough.  She has had no fevers.  No vomiting no diarrhea no dysuria.  Review of Systems    Past Medical History:   Diagnosis Date    Diabetes mellitus type I     Hyperlipidemia        Allergies   Allergen Reactions    Codeine Other (See Comments)     Memory loss          Past Surgical History:   Procedure Laterality Date     SECTION         Family History   Problem Relation Age of Onset    Bipolar disorder Mother     Schizophrenia Mother     Ulcers Father     Asthma Sister        Social History     Socioeconomic History    Marital status:    Tobacco Use    Smoking status: Never    Smokeless tobacco: Never   Vaping Use    Vaping status: Never Used   Substance and Sexual Activity    Alcohol use: Never    Drug use: Never    Sexual activity: Defer     Prior to Admission medications    Medication Sig Start Date End Date Taking? Authorizing Provider   insulin aspart (NovoLOG FlexPen) 100 UNIT/ML solution pen-injector sc pen INJECT 1 UNIT OF EACH 5 G OF CARBOHYDRATE BEFORE EACH MEAL and sliding scale 1 unit for each 30 mg/dLof blood sugar over 140 at meals only. *MAX DOSE 100 8/15/23   Jody Saldana MD   Insulin Aspart, w/Niacinamide, (Fiasp FlexTouch) 100 UNIT/ML solution pen-injector INJECT 1 UNIT OF EACH 5 G OF CARBOHYDRATE BEFORE EACH MEAL and sliding scale 1 unit for each 30 mg/dLof blood sugar over 140 at meals only. *MAX DOSE 100 UNITS  Dx code: E10.65 23   Bill Bray DO   insulin detemir (Levemir FlexPen) 100 UNIT/ML injection Inject 30 Units under the skin into the appropriate area as directed 2 (Two) Times a Day. Dx code: E10.65 23   Bill Bray DO   insulin glargine (Lantus) 100 UNIT/ML injection as  directed subcutaneously    ProviderRob MD   loratadine (CLARITIN) 10 MG tablet Take 1 tablet by mouth Daily As Needed for Allergies.    Provider, MD Rob           Objective   Physical Exam  44-year-old female awake alert.  Generally well-developed well-nourished.  Pupils equal round react to light.  TMs not visualized cerumen bilaterally in external auditory canal, oropharynx clear mucous membranes moist neck supple without adenopathy chest clear equal breath sounds cardiovascular regular rhythm abdomen soft nontender skin without rash noted.  Procedures           ED Course      Results for orders placed or performed during the hospital encounter of 06/13/25   Respiratory Panel PCR w/COVID-19(SARS-CoV-2) YO/CAROLINE/ARLINE/PAD/COR/CHRISTOPHER In-House, NP Swab in UTM/VTM, 2 HR TAT - Swab, Nasopharynx    Collection Time: 06/13/25  8:23 AM    Specimen: Nasopharynx; Swab   Result Value Ref Range    ADENOVIRUS, PCR Not Detected Not Detected    Coronavirus 229E Not Detected Not Detected    Coronavirus HKU1 Not Detected Not Detected    Coronavirus NL63 Not Detected Not Detected    Coronavirus OC43 Not Detected Not Detected    COVID19 Not Detected Not Detected - Ref. Range    Human Metapneumovirus Not Detected Not Detected    Human Rhinovirus/Enterovirus Not Detected Not Detected    Influenza A PCR Not Detected Not Detected    Influenza B PCR Not Detected Not Detected    Parainfluenza Virus 1 Not Detected Not Detected    Parainfluenza Virus 2 Not Detected Not Detected    Parainfluenza Virus 3 Not Detected Not Detected    Parainfluenza Virus 4 Not Detected Not Detected    RSV, PCR Not Detected Not Detected    Bordetella pertussis pcr Not Detected Not Detected    Bordetella parapertussis PCR Not Detected Not Detected    Chlamydophila pneumoniae PCR Not Detected Not Detected    Mycoplasma pneumo by PCR Not Detected Not Detected   Rapid Strep A Screen - Swab, Throat    Collection Time: 06/13/25  8:23 AM    Specimen: Throat;  Swab   Result Value Ref Range    Strep A Ag Negative Negative   Comprehensive Metabolic Panel    Collection Time: 06/13/25  8:32 AM    Specimen: Blood   Result Value Ref Range    Glucose 391 (H) 65 - 99 mg/dL    BUN 9.0 6.0 - 20.0 mg/dL    Creatinine 0.69 0.57 - 1.00 mg/dL    Sodium 134 (L) 136 - 145 mmol/L    Potassium 4.4 3.5 - 5.2 mmol/L    Chloride 100 98 - 107 mmol/L    CO2 23.6 22.0 - 29.0 mmol/L    Calcium 8.9 8.6 - 10.5 mg/dL    Total Protein 7.9 6.0 - 8.5 g/dL    Albumin 4.1 3.5 - 5.2 g/dL    ALT (SGPT) 39 (H) 1 - 33 U/L    AST (SGOT) 31 1 - 32 U/L    Alkaline Phosphatase 69 39 - 117 U/L    Total Bilirubin 0.5 0.0 - 1.2 mg/dL    Globulin 3.8 gm/dL    A/G Ratio 1.1 g/dL    BUN/Creatinine Ratio 13.0 7.0 - 25.0    Anion Gap 10.4 5.0 - 15.0 mmol/L    eGFR 109.9 >60.0 mL/min/1.73   CBC Auto Differential    Collection Time: 06/13/25  8:32 AM    Specimen: Blood   Result Value Ref Range    WBC 3.28 (L) 3.40 - 10.80 10*3/mm3    RBC 4.20 3.77 - 5.28 10*6/mm3    Hemoglobin 11.3 (L) 12.0 - 15.9 g/dL    Hematocrit 35.5 34.0 - 46.6 %    MCV 84.5 79.0 - 97.0 fL    MCH 26.9 26.6 - 33.0 pg    MCHC 31.8 31.5 - 35.7 g/dL    RDW 13.3 12.3 - 15.4 %    RDW-SD 41.1 37.0 - 54.0 fl    MPV 10.7 6.0 - 12.0 fL    Platelets 176 140 - 450 10*3/mm3    Neutrophil % 48.8 42.7 - 76.0 %    Lymphocyte % 26.2 19.6 - 45.3 %    Monocyte % 20.7 (H) 5.0 - 12.0 %    Eosinophil % 3.7 0.3 - 6.2 %    Basophil % 0.3 0.0 - 1.5 %    Immature Grans % 0.3 0.0 - 0.5 %    Neutrophils, Absolute 1.60 (L) 1.70 - 7.00 10*3/mm3    Lymphocytes, Absolute 0.86 0.70 - 3.10 10*3/mm3    Monocytes, Absolute 0.68 0.10 - 0.90 10*3/mm3    Eosinophils, Absolute 0.12 0.00 - 0.40 10*3/mm3    Basophils, Absolute 0.01 0.00 - 0.20 10*3/mm3    Immature Grans, Absolute 0.01 0.00 - 0.05 10*3/mm3    nRBC 0.0 0.0 - 0.2 /100 WBC   Mononucleosis Screen    Collection Time: 06/13/25  8:32 AM    Specimen: Blood   Result Value Ref Range    Monospot Negative Negative   Gold Top - SST     "Collection Time: 06/13/25  8:32 AM   Result Value Ref Range    Extra Tube Hold for add-ons.      No orders to display     Medications   insulin glargine (LANTUS, SEMGLEE) injection 10 Units (has no administration in time range)     /69 (BP Location: Left arm, Patient Position: Lying)   Pulse 90   Temp 98.7 °F (37.1 °C) (Oral)   Resp 16   Ht 157.5 cm (62\")   Wt 78.7 kg (173 lb 8 oz)   LMP 05/27/2025   SpO2 97%   BMI 31.73 kg/m²                                                    Medical Decision Making  Amount and/or Complexity of Data Reviewed  Labs: ordered.    Risk  Prescription drug management.    Chart review: Patient had admission July 2023 for DKA  Comorbidity: As per past history   Differential: Viral illness, COVID, strep throat  My EKG interpretation: Not indicated  Lab: Strep a antigen negative, CBC white count 3.28 with 48 segs 26 lymphs 20 monos on differential.  Comprehensive metabolic panel glucose 391 sodium 134 CO2 23.6.  Respiratory panel negative, Monospot negative  My Radiology review and interpretation: Chest x-ray considered but not felt to be needed  Discussion/treatment: Patient reports she had not taken her insulin this morning.  She was given 10 units of Lantus.  She reports she normally takes 10 units of Levemir in the morning.  Patient's findings were discussed with her.  She does report that she found a tick on her about 2 weeks ago.  She is not sure how long it had been attached.  She did not develop any rash.  Will send off tick panel and treat presumptively in light of otherwise negative evaluation.  She was placed on doxycycline.  Patient was evaluated using appropriate PPE      Final diagnoses:   Myalgia   Uncontrolled type 1 diabetes mellitus with hypoglycemia without coma       ED Disposition  ED Disposition       ED Disposition   Discharge    Condition   Stable    Comment   --               Elsi Covington MD  [address]  [address] [place] " [address] [phone]    Schedule an appointment as soon as possible for a visit   For recheck and review your diabetic control and blood sugars.         Medication List        New Prescriptions      doxycycline 100 MG capsule  Commonly known as: MONODOX  Take 1 capsule by mouth 2 (Two) Times a Day.               Where to Get Your Medications        These medications were sent to [id number] - [place], [place] - [address] [facility] [address] [facility] - [phone]  - [phone]   [address] [facility] [place] [id number]      Phone: [phone]   doxycycline 100 MG capsule            Jim Lomeli MD  06/13/25 9299

## 2025-06-13 NOTE — DISCHARGE INSTRUCTIONS
Rest, plenty fluids, monitor blood sugar use sliding scale as directed.  May use Tylenol, ibuprofen for aches or fever.  Return new or worsening symptoms

## 2025-06-17 LAB
A PHAGOCYTOPH DNA BLD QL NAA+PROBE: NEGATIVE
EHRLICHIA DNA SPEC QL NAA+PROBE: NEGATIVE

## 2025-06-19 LAB — RICKETTSIA RICKETTSII DNA, RT: NOT DETECTED

## 2025-07-05 RX ORDER — INSULIN ASPART 100 [IU]/ML
INJECTION, SOLUTION INTRAVENOUS; SUBCUTANEOUS
Qty: 1 ML | Refills: 0 | Status: SHIPPED | OUTPATIENT
Start: 2025-07-05